# Patient Record
Sex: MALE | Race: WHITE | Employment: OTHER | URBAN - METROPOLITAN AREA
[De-identification: names, ages, dates, MRNs, and addresses within clinical notes are randomized per-mention and may not be internally consistent; named-entity substitution may affect disease eponyms.]

---

## 2017-04-25 ENCOUNTER — TELEPHONE (OUTPATIENT)
Dept: HEMATOLOGY/ONCOLOGY | Facility: HOSPITAL | Age: 67
End: 2017-04-25

## 2017-04-25 NOTE — TELEPHONE ENCOUNTER
Daughter reports pt is starting a chemo tx 4/26 with a \"IV of 55 hours\"- possibly FOLFOX, is unable to move pt here until sometime in June, wanted to know process to make a seem less move during chemo.   Daughter, Tremaine Mcgraw is POA, asked to come to [facility] to sig

## 2017-04-27 ENCOUNTER — NURSE ONLY (OUTPATIENT)
Dept: HEMATOLOGY/ONCOLOGY | Facility: HOSPITAL | Age: 67
End: 2017-04-27

## 2017-04-27 NOTE — PROGRESS NOTES
Received release of information for records.  Signed by POA and faxed to office of Dr. Honey Martinez with [facility] at [phone] per MD request.

## 2017-05-08 ENCOUNTER — TELEPHONE (OUTPATIENT)
Dept: HEMATOLOGY/ONCOLOGY | Facility: HOSPITAL | Age: 67
End: 2017-05-08

## 2017-05-08 NOTE — TELEPHONE ENCOUNTER
Dtr calling to request consult appointment. Asking if records received. Will verify with MD.  Call sent to [facility] to schedule.

## 2017-05-08 NOTE — TELEPHONE ENCOUNTER
\"  I got some records but seemed like they aren't complete or the most recent.  I'm happy to see, but not sure if we can guarantee that we can give chemo the same day.  Are they expecting chemo same day?  I need more recent records of chemo administration

## 2017-05-23 ENCOUNTER — OFFICE VISIT (OUTPATIENT)
Dept: HEMATOLOGY/ONCOLOGY | Age: 67
End: 2017-05-23
Attending: INTERNAL MEDICINE
Payer: MEDICARE

## 2017-05-23 ENCOUNTER — NURSE ONLY (OUTPATIENT)
Dept: HEMATOLOGY/ONCOLOGY | Age: 67
End: 2017-05-23
Attending: INTERNAL MEDICINE
Payer: MEDICARE

## 2017-05-23 VITALS
BODY MASS INDEX: 20 KG/M2 | HEART RATE: 90 BPM | WEIGHT: 160 LBS | SYSTOLIC BLOOD PRESSURE: 138 MMHG | OXYGEN SATURATION: 98 % | RESPIRATION RATE: 18 BRPM | DIASTOLIC BLOOD PRESSURE: 69 MMHG | TEMPERATURE: 99 F

## 2017-05-23 DIAGNOSIS — C18.9 COLON ADENOCARCINOMA (HCC): Primary | ICD-10-CM

## 2017-05-23 DIAGNOSIS — T45.1X5A CHEMOTHERAPY INDUCED NAUSEA AND VOMITING: ICD-10-CM

## 2017-05-23 DIAGNOSIS — D50.0 IRON DEFICIENCY ANEMIA DUE TO CHRONIC BLOOD LOSS: ICD-10-CM

## 2017-05-23 DIAGNOSIS — R11.2 CHEMOTHERAPY INDUCED NAUSEA AND VOMITING: ICD-10-CM

## 2017-05-23 PROCEDURE — 85025 COMPLETE CBC W/AUTO DIFF WBC: CPT

## 2017-05-23 PROCEDURE — 82728 ASSAY OF FERRITIN: CPT

## 2017-05-23 PROCEDURE — 36591 DRAW BLOOD OFF VENOUS DEVICE: CPT

## 2017-05-23 PROCEDURE — 85045 AUTOMATED RETICULOCYTE COUNT: CPT

## 2017-05-23 PROCEDURE — 99205 OFFICE O/P NEW HI 60 MIN: CPT | Performed by: INTERNAL MEDICINE

## 2017-05-23 PROCEDURE — 80053 COMPREHEN METABOLIC PANEL: CPT

## 2017-05-23 RX ORDER — SODIUM CHLORIDE 0.9 % (FLUSH) 0.9 %
10 SYRINGE (ML) INJECTION ONCE
Status: COMPLETED | OUTPATIENT
Start: 2017-05-23 | End: 2017-05-23

## 2017-05-23 RX ADMIN — SODIUM CHLORIDE 0.9 % (FLUSH) 10 ML: 0.9 % SYRINGE (ML) INJECTION at 14:01:00

## 2017-05-23 NOTE — PROGRESS NOTES
Hematology/Oncology Initial Consultation Note    Patient Name: Marissa Montelongo  Medical Record Number: [id number]    YOB: 1950   Date of Consultation: 5/23/2017   PCP: Dr. Solo Hanson [phone]    Reason for Consultation:  Marissa Montelongo with his daughter today after just moving back from [place] [address] where he was living independently prior to his hospitalization and cancer diagnosis.   He reports that he had a heart attack 5/2016 which was stented and was on dual antiplatelets for about a month (bar some back spasms   • Colon adenocarcinoma [facility])           Past Surgical History    COLON SURGERY  2/17/17    Comment LAR, laparoscopic sigmoid colectomy and resection of portion of small bowel with asher procedure and partial cystectomy with bladder rep of Children: N/A     Occupational History  None on file     Social History Main Topics   Smoking status: Former Smoker  1.00 Packs/Day  For 20.00 Years     Types: Cigarettes    Quit date: 01/01/1983    Smokeless tobacco: Not on file    Comment: cong Date   WBC 6.0 05/23/2017   HGB 10.3* 05/23/2017   HCT 33.3* 05/23/2017   MCV 86.5 05/23/2017   MCH 26.8* 05/23/2017   MCHC 30.9* 05/23/2017   RDW 18.7* 05/23/2017   .0 05/23/2017       Lab Results  Component Value Date   GLU 92 05/23/2017   BUN 11 and observe how he does next cycle.    -will arrange a formal chemo education with our APN so he and his family can learn about taking prn zofran and/or compazine as needed. *iron deficiency anemia  -based on low ret-hgb value.   Is likely due to GI ble

## 2017-05-23 NOTE — PROGRESS NOTES
Pt here for MD consultation for h/o colon ca. Recently moved to [facility] from [place] to be near family. Last chemotherapy tx was 5/10/17. Had 3-4 days of weakness, poor appetite, nausea after chemotherapy. No changes in ostomy output.      Education Record

## 2017-05-23 NOTE — PROGRESS NOTES
Called Dr. Carol Hobson, pt's general surgeon office- spoke with Heather Viveros- requested all surgical records including path, op report, H&P. Records will be faxed tomorrow, 5/24.     Called Dr. Jorge Miranda, pt's medical oncologist office- requested all records be fa

## 2017-05-25 PROBLEM — D50.0 IRON DEFICIENCY ANEMIA DUE TO CHRONIC BLOOD LOSS: Status: ACTIVE | Noted: 2017-05-25

## 2017-05-25 RX ORDER — PROCHLORPERAZINE MALEATE 10 MG
10 TABLET ORAL EVERY 6 HOURS PRN
Qty: 30 TABLET | Refills: 3 | Status: SHIPPED | OUTPATIENT
Start: 2017-05-25 | End: 2017-05-31

## 2017-05-25 RX ORDER — ONDANSETRON HYDROCHLORIDE 8 MG/1
8 TABLET, FILM COATED ORAL EVERY 8 HOURS PRN
Qty: 30 TABLET | Refills: 3 | Status: SHIPPED | OUTPATIENT
Start: 2017-05-25 | End: 2017-05-31

## 2017-05-26 RX ORDER — DOCUSATE SODIUM 100 MG/1
100 CAPSULE, LIQUID FILLED ORAL 2 TIMES DAILY PRN
COMMUNITY
End: 2017-07-25

## 2017-05-26 RX ORDER — HYDROCODONE BITARTRATE AND ACETAMINOPHEN 10; 325 MG/1; MG/1
1 TABLET ORAL EVERY 4 HOURS PRN
Status: ON HOLD | COMMUNITY
End: 2018-04-27 | Stop reason: ALTCHOICE

## 2017-05-26 RX ORDER — MULTIVITAMIN
1 TABLET ORAL DAILY
COMMUNITY
End: 2019-04-08

## 2017-05-26 RX ORDER — MULTIVIT WITH MINERALS/LUTEIN
250 TABLET ORAL DAILY
COMMUNITY

## 2017-05-26 RX ORDER — LANCETS 28 GAUGE
EACH MISCELLANEOUS
COMMUNITY
Start: 2016-02-11

## 2017-05-26 RX ORDER — DULOXETIN HYDROCHLORIDE 60 MG/1
60 CAPSULE, DELAYED RELEASE ORAL DAILY
COMMUNITY
End: 2017-10-24

## 2017-05-26 RX ORDER — METOPROLOL TARTRATE 50 MG/1
50 TABLET, FILM COATED ORAL DAILY
Status: ON HOLD | COMMUNITY
Start: 2016-02-10 | End: 2018-05-02

## 2017-05-26 RX ORDER — NITROGLYCERIN 0.4 MG/1
0.4 TABLET SUBLINGUAL EVERY 5 MIN PRN
COMMUNITY
Start: 2016-02-10

## 2017-05-26 RX ORDER — ALPRAZOLAM 0.25 MG/1
0.25 TABLET ORAL 3 TIMES DAILY PRN
COMMUNITY
End: 2018-02-20

## 2017-05-26 RX ORDER — ASPIRIN 81 MG/1
81 TABLET ORAL
COMMUNITY
Start: 2017-03-03 | End: 2017-05-26

## 2017-05-26 RX ORDER — RANITIDINE 150 MG/1
150 TABLET ORAL 2 TIMES DAILY
COMMUNITY

## 2017-05-26 RX ORDER — MELATONIN
325
COMMUNITY
End: 2017-06-20

## 2017-05-26 NOTE — PROGRESS NOTES
Discharge medication  sheet rec'd from [facility], updated ECC med review per this sheet as of 5/26/17.

## 2017-05-30 PROBLEM — R11.2 CHEMOTHERAPY INDUCED NAUSEA AND VOMITING: Status: ACTIVE | Noted: 2017-05-30

## 2017-05-30 PROBLEM — T45.1X5A CHEMOTHERAPY INDUCED NAUSEA AND VOMITING: Status: ACTIVE | Noted: 2017-05-30

## 2017-05-31 ENCOUNTER — OFFICE VISIT (OUTPATIENT)
Dept: HEMATOLOGY/ONCOLOGY | Facility: HOSPITAL | Age: 67
End: 2017-05-31
Attending: INTERNAL MEDICINE
Payer: MEDICARE

## 2017-05-31 ENCOUNTER — OFFICE VISIT (OUTPATIENT)
Dept: GENETICS | Facility: HOSPITAL | Age: 67
End: 2017-05-31
Attending: INTERNAL MEDICINE
Payer: MEDICARE

## 2017-05-31 ENCOUNTER — SNF/IP PROF CHARGE ONLY (OUTPATIENT)
Dept: HEMATOLOGY/ONCOLOGY | Facility: HOSPITAL | Age: 67
End: 2017-05-31

## 2017-05-31 VITALS
RESPIRATION RATE: 16 BRPM | HEART RATE: 55 BPM | HEIGHT: 73.39 IN | BODY MASS INDEX: 20.91 KG/M2 | TEMPERATURE: 98 F | WEIGHT: 159.5 LBS

## 2017-05-31 DIAGNOSIS — C18.9 COLON ADENOCARCINOMA (HCC): Primary | ICD-10-CM

## 2017-05-31 DIAGNOSIS — Z83.71 FAMILY HISTORY OF COLONIC POLYPS: ICD-10-CM

## 2017-05-31 DIAGNOSIS — Z71.9 ENCOUNTER FOR EDUCATION: ICD-10-CM

## 2017-05-31 DIAGNOSIS — C18.9 ADENOCARCINOMA OF COLON (HCC): Primary | ICD-10-CM

## 2017-05-31 DIAGNOSIS — D50.0 IRON DEFICIENCY ANEMIA DUE TO CHRONIC BLOOD LOSS: ICD-10-CM

## 2017-05-31 DIAGNOSIS — T45.1X5A CHEMOTHERAPY INDUCED NAUSEA AND VOMITING: ICD-10-CM

## 2017-05-31 DIAGNOSIS — R11.2 CHEMOTHERAPY INDUCED NAUSEA AND VOMITING: ICD-10-CM

## 2017-05-31 DIAGNOSIS — C18.9 COLON ADENOCARCINOMA (HCC): ICD-10-CM

## 2017-05-31 PROCEDURE — 85007 BL SMEAR W/DIFF WBC COUNT: CPT

## 2017-05-31 PROCEDURE — 85027 COMPLETE CBC AUTOMATED: CPT

## 2017-05-31 PROCEDURE — 99215 OFFICE O/P EST HI 40 MIN: CPT | Performed by: INTERNAL MEDICINE

## 2017-05-31 PROCEDURE — 96413 CHEMO IV INFUSION 1 HR: CPT

## 2017-05-31 PROCEDURE — 96368 THER/DIAG CONCURRENT INF: CPT

## 2017-05-31 PROCEDURE — G9678 ONCOLOGY CARE MODEL SERVICE: HCPCS | Performed by: INTERNAL MEDICINE

## 2017-05-31 PROCEDURE — 80053 COMPREHEN METABOLIC PANEL: CPT

## 2017-05-31 PROCEDURE — 96411 CHEMO IV PUSH ADDL DRUG: CPT

## 2017-05-31 PROCEDURE — 96415 CHEMO IV INFUSION ADDL HR: CPT

## 2017-05-31 PROCEDURE — 96416 CHEMO PROLONG INFUSE W/PUMP: CPT

## 2017-05-31 PROCEDURE — 85025 COMPLETE CBC W/AUTO DIFF WBC: CPT

## 2017-05-31 PROCEDURE — 96375 TX/PRO/DX INJ NEW DRUG ADDON: CPT

## 2017-05-31 RX ORDER — ONDANSETRON HYDROCHLORIDE 8 MG/1
8 TABLET, FILM COATED ORAL EVERY 8 HOURS PRN
Qty: 30 TABLET | Refills: 3 | Status: SHIPPED | OUTPATIENT
Start: 2017-05-31

## 2017-05-31 RX ORDER — SODIUM CHLORIDE 0.9 % (FLUSH) 0.9 %
10 SYRINGE (ML) INJECTION ONCE
Status: COMPLETED | OUTPATIENT
Start: 2017-05-31 | End: 2017-05-31

## 2017-05-31 RX ORDER — PROCHLORPERAZINE MALEATE 10 MG
10 TABLET ORAL EVERY 6 HOURS PRN
Qty: 30 TABLET | Refills: 3 | Status: SHIPPED | OUTPATIENT
Start: 2017-05-31

## 2017-05-31 RX ORDER — FLUOROURACIL 50 MG/ML
2400 INJECTION, SOLUTION INTRAVENOUS CONTINUOUS
Status: DISCONTINUED | OUTPATIENT
Start: 2017-05-31 | End: 2017-05-31

## 2017-05-31 RX ORDER — FLUOROURACIL 50 MG/ML
400 INJECTION, SOLUTION INTRAVENOUS ONCE
Status: COMPLETED | OUTPATIENT
Start: 2017-05-31 | End: 2017-05-31

## 2017-05-31 RX ADMIN — SODIUM CHLORIDE 0.9 % (FLUSH) 10 ML: 0.9 % SYRINGE (ML) INJECTION at 10:00:00

## 2017-05-31 RX ADMIN — FLUOROURACIL 800 MG: 50 INJECTION, SOLUTION INTRAVENOUS at 14:10:00

## 2017-05-31 RX ADMIN — FLUOROURACIL 4800 MG: 50 INJECTION, SOLUTION INTRAVENOUS at 14:15:00

## 2017-05-31 NOTE — PROGRESS NOTES
Education Record    Learner:  Patient    Disease / Diagnosis:Colon adenocarcinoma    Barriers / Limitations:  None   Comments:    Method:  Brief focused   Comments:    General Topics:  Infection, Medication, Pain, Precautions, Procedure, Side effects and s

## 2017-05-31 NOTE — PROGRESS NOTES
Referring Provider:  Dr. Lili Coughlin    Reason for Referral:  Erin Moon was referred for genetic counseling because of a personal history of colon cancer with abnormal tumor studies.   Mr. Amor Gomez is a 74year-old man of [facility] [place] [facility], [place], and [facility] was one of 12 siblings, three of whom may have had some type of cancer. In addition, his maternal aunt had some type of cancer in her 46s or 62s. No further information is available. There is no known Ashkenazi Taoism ancestry.    Please see the pedigr carriers, including increased colorectal cancer screening by colonoscopy every 1-2 years starting at age 23-25. Surveillance and screening for other cancers associated with Up syndrome may also be indicated.   All medical management decisions should be testing for a colon cancer panel to address potential hereditary cancer risks. The panel would include MLH1, PMS2, and polyposis genes such as MUTYH and APC. I discussed sending the genetic testing to [facility].   Consequently, if Medicare denies the netta develop cancer. If Mr. Christine Melendrez were to test positive for a mutation, his children would have a 50% chance of carrying the same mutation. They would have the option of pursuing targeted genetic testing at a reduced cost to clarify their cancer risks.  Stacy Kennedy

## 2017-05-31 NOTE — PATIENT INSTRUCTIONS
For Dr. Phyllis Mcdaniel nurse line, call [phone] with any questions or concerns Monday through Friday 8:00 to 4:30. After hours or weekends for emergent needs [phone].

## 2017-05-31 NOTE — PROGRESS NOTES
Chemotherapy Education    Patient:Cornelius Trujillo  Date: 5/31/17  Barriers / Limitations:  Cognitive limitations  Diagnosis: colon cancer  Caregivers present: daughter    Drug names: 5FU, leucovorin, oxaliplatin    Chemotherapy education goals:  · Learn the jenn dryness  Comments.     Vesicants / Irritants:  Potential extravasation at site of administration:  Achieved  Signs / symptoms include redness, swelling, pain, burning, or blistering at the site of administration:  Achieved  Notify MD/RN IMMEDIATELY if you h

## 2017-05-31 NOTE — PROGRESS NOTES
Patient here for MD f/u and treatment. C3 chemo. Griselda Harmon Received 2 cycles previously in [place]. Moved to be closer to family. States feeling well today. Slight fatigue, minimal SOB with exertion.   Taking Hydrocodone about every 4 hours when experiencing BLE pain

## 2017-05-31 NOTE — PROGRESS NOTES
Hematology/Oncology Clinic Follow Up Visit    Patient Name: Alvin Louis  Medical Record Number: [id number]    YOB: 1950    PCP: Dr. Andrez Hall ph [phone]    Reason for Consultation:  Alvin Louis was seen today for the diagnosis of joint pains. He continues to participate in PT at his assisted living facility. He denies any new issues. Energy is improving. Denies any changes in his ostomy output. No bleeding. No neuropathy.      Past Medical History:  Past Medical History   Diagn Oral Tab Take 1 tablet by mouth every 4 (four) hours as needed for Pain. Disp:  Rfl:    Ondansetron HCl (ZOFRAN) 8 MG tablet Take 1 tablet (8 mg total) by mouth every 8 (eight) hours as needed for Nausea.  Disp: 30 tablet Rfl: 3   ASPIRIN 81 MG OR TABS 1 TA Examination:  General: Patient is alert and oriented, not in acute distress  Psych: Mood and affect are appropriate  Eyes: EOMI  ENT: Oropharynx is clear  CV: Regular rate and rhythm, no murmurs, no LE edema  Respiratory: Lungs clear to auscultation bilate with nausea, vomiting, poor appetite and fatigue previously due to this  -will proceed with cycle 3 FOLFOX today as below:   -5FU 400mg/m2 IV d1 then 2400mg/m2 CIVI over 46hrs   -Leucovorin 400mg/m2 IV d1   -Oxaliplatin 85mg/m2 IV d1   cycle length [id number]

## 2017-06-01 ENCOUNTER — TELEPHONE (OUTPATIENT)
Dept: HEMATOLOGY/ONCOLOGY | Facility: HOSPITAL | Age: 67
End: 2017-06-01

## 2017-06-01 NOTE — TELEPHONE ENCOUNTER
Has to arrange rides for patient, asking to set up several cycles of chemo ahead of time. OK per Dr. Mello Mcmahon. Transferred to scheduling to make appts. OK given to [facility].

## 2017-06-02 ENCOUNTER — NURSE ONLY (OUTPATIENT)
Dept: HEMATOLOGY/ONCOLOGY | Age: 67
End: 2017-06-02
Attending: INTERNAL MEDICINE
Payer: MEDICARE

## 2017-06-02 DIAGNOSIS — C18.9 COLON ADENOCARCINOMA (HCC): Primary | ICD-10-CM

## 2017-06-02 PROCEDURE — 96523 IRRIG DRUG DELIVERY DEVICE: CPT

## 2017-06-02 RX ORDER — SODIUM CHLORIDE 0.9 % (FLUSH) 0.9 %
10 SYRINGE (ML) INJECTION ONCE
Status: COMPLETED | OUTPATIENT
Start: 2017-06-02 | End: 2017-06-02

## 2017-06-02 RX ADMIN — SODIUM CHLORIDE 0.9 % (FLUSH) 10 ML: 0.9 % SYRINGE (ML) INJECTION at 11:38:00

## 2017-06-02 NOTE — PROGRESS NOTES
Daughter accompanied patient for pump d/c today. He lives next door to the [facility] [place] and the daughter will be disconnecting his pump. Written instructions provided and talked daughter through technique.   Appeared comfortable

## 2017-06-06 ENCOUNTER — OFFICE VISIT (OUTPATIENT)
Dept: HEMATOLOGY/ONCOLOGY | Age: 67
End: 2017-06-06
Attending: INTERNAL MEDICINE
Payer: MEDICARE

## 2017-06-06 ENCOUNTER — NURSE ONLY (OUTPATIENT)
Dept: HEMATOLOGY/ONCOLOGY | Age: 67
End: 2017-06-06
Attending: INTERNAL MEDICINE
Payer: MEDICARE

## 2017-06-06 ENCOUNTER — DIETICIAN VISIT (OUTPATIENT)
Dept: HEMATOLOGY/ONCOLOGY | Facility: HOSPITAL | Age: 67
End: 2017-06-06

## 2017-06-06 VITALS
OXYGEN SATURATION: 100 % | TEMPERATURE: 97 F | HEART RATE: 54 BPM | DIASTOLIC BLOOD PRESSURE: 75 MMHG | BODY MASS INDEX: 20 KG/M2 | SYSTOLIC BLOOD PRESSURE: 147 MMHG | WEIGHT: 153.38 LBS | RESPIRATION RATE: 18 BRPM

## 2017-06-06 DIAGNOSIS — R19.7 DIARRHEA: ICD-10-CM

## 2017-06-06 DIAGNOSIS — R11.0 NAUSEA: ICD-10-CM

## 2017-06-06 DIAGNOSIS — R19.7 DIARRHEA, UNSPECIFIED TYPE: ICD-10-CM

## 2017-06-06 DIAGNOSIS — C18.9 COLON ADENOCARCINOMA (HCC): Primary | ICD-10-CM

## 2017-06-06 DIAGNOSIS — R11.2 NON-INTRACTABLE VOMITING WITH NAUSEA, UNSPECIFIED VOMITING TYPE: ICD-10-CM

## 2017-06-06 PROCEDURE — 96360 HYDRATION IV INFUSION INIT: CPT

## 2017-06-06 PROCEDURE — 83735 ASSAY OF MAGNESIUM: CPT

## 2017-06-06 PROCEDURE — 85025 COMPLETE CBC W/AUTO DIFF WBC: CPT

## 2017-06-06 PROCEDURE — 96374 THER/PROPH/DIAG INJ IV PUSH: CPT

## 2017-06-06 PROCEDURE — 96361 HYDRATE IV INFUSION ADD-ON: CPT

## 2017-06-06 PROCEDURE — 99214 OFFICE O/P EST MOD 30 MIN: CPT | Performed by: NURSE PRACTITIONER

## 2017-06-06 PROCEDURE — 80053 COMPREHEN METABOLIC PANEL: CPT

## 2017-06-06 RX ORDER — SODIUM CHLORIDE 0.9 % (FLUSH) 0.9 %
10 SYRINGE (ML) INJECTION ONCE
Status: COMPLETED | OUTPATIENT
Start: 2017-06-06 | End: 2017-06-06

## 2017-06-06 RX ORDER — SODIUM CHLORIDE 9 MG/ML
1000 INJECTION, SOLUTION INTRAVENOUS ONCE
Status: COMPLETED | OUTPATIENT
Start: 2017-06-06 | End: 2017-06-06

## 2017-06-06 RX ORDER — LOPERAMIDE HYDROCHLORIDE 2 MG/1
2 TABLET ORAL AS NEEDED
Qty: 30 TABLET | Refills: 0 | Status: SHIPPED | OUTPATIENT
Start: 2017-06-06

## 2017-06-06 RX ADMIN — SODIUM CHLORIDE 1000 ML: 9 INJECTION, SOLUTION INTRAVENOUS at 12:05:00

## 2017-06-06 RX ADMIN — SODIUM CHLORIDE 0.9 % (FLUSH) 10 ML: 0.9 % SYRINGE (ML) INJECTION at 13:06:00

## 2017-06-06 NOTE — PROGRESS NOTES
Nutrition screen complete as triggered by [facility] dx of colon cancer. Chart reviewed. Oncologist contacted. Pt appears at a moderate nutrition risk at present. RD available on consult.

## 2017-06-06 NOTE — PROGRESS NOTES
ANP Visit Note    Patient Name: Michael Rausch   YOB: 1950   Medical Record Number: [id number]   CSN: [id number]   Date of visit: 6/6/2017       Chief Complaint/Reason for Visit: Follow up chemotherapy colon adenocarcinoma      History of Present showed loss of expression of PMS2 and weak MLH-1 immunoreactivity (10%). Normal MSH6 and MSH2. pT4bN0  -4/13/17- CEA 3.1  -4/19/17- PET/CT- no distant disease.  +post-operative changes s/p LAR with LLQ ostomy placement.  Intense focal uptake in left pelvis 01/01/1983    Smokeless tobacco: Not on file    Comment: ocassional cigar - quit 25 years ago    Alcohol Use: Yes  0.0 oz/week    0 Standard drinks or equivalent per week         Comment: occasionally- rare.   No history of excessive use    Drug Use: No Sleep., Disp: , Rfl:   •  DULoxetine HCl 60 MG Oral Cap DR Particles, Take 60 mg by mouth daily. , Disp: , Rfl:   •  ascorbic acid, VITAMIN C, 250 MG Oral Tab, Take 250 mg by mouth daily. , Disp: , Rfl:   •  HYDROcodone-acetaminophen  MG Oral Tab, Take Status: Preliminary   AST Date: 06/06/2017   Value: 18  Ref Range 15-41 U/L Status: Preliminary   Alt Date: 06/06/2017   Value: 18  Ref Range 17-63 U/L Status: Preliminary   Albumin Date: 06/06/2017   Value: 3.5  Ref Range 3.5-4.8 g/dL Status: Preliminary x10(3) uL Status: Final   Immature Granulocyte Absolute Date: 06/06/2017   Value: 0.01  Ref Range 0.00-1.00 x10(3) uL Status: Final   Neutrophil % Date: 06/06/2017   Value: 70.2  Status: Final   Lymphocyte % Date: 06/06/2017   Value: 23.7  Status: Final

## 2017-06-06 NOTE — PROGRESS NOTES
Pt here for APN f/u. Pt rec'd cycle 3 FOLFOX 5/31, notes this time \"hit him harder than before\". Pt notes feeling nausea, takes Zofran, instruced on alternating schedule with Compazine. Pt notes feeling weak and low appetite.  Pt's daughter feels pt is mo

## 2017-06-06 NOTE — PROGRESS NOTES
Per MORE Li. Labs good. Finished 500 cc NS only and OK to discharge. Discussed with patient. RX for Imodium given so care facility can administer.   Education Record    Learner:  Patient    Disease / Diagnosis: colon adeno    Barriers / Limitations:  No

## 2017-06-06 NOTE — PATIENT INSTRUCTIONS
Take Imodium as prescribed. STOP stool softener- see nursing order.   Call triage RN with questions [phone]

## 2017-06-13 ENCOUNTER — APPOINTMENT (OUTPATIENT)
Dept: HEMATOLOGY/ONCOLOGY | Age: 67
End: 2017-06-13
Attending: INTERNAL MEDICINE
Payer: MEDICARE

## 2017-06-13 ENCOUNTER — TELEPHONE (OUTPATIENT)
Dept: HEMATOLOGY/ONCOLOGY | Facility: HOSPITAL | Age: 67
End: 2017-06-13

## 2017-06-13 NOTE — TELEPHONE ENCOUNTER
Pt LM with answering service last night to cancel appointment. Called number listed- this is his dtr's number. She was unaware that he cancelled appointment today. Spoke with patient- he c/o severe bilateral knee pains, h/o arthritis, and gout.  He is eat

## 2017-06-14 ENCOUNTER — TELEPHONE (OUTPATIENT)
Dept: HEMATOLOGY/ONCOLOGY | Facility: HOSPITAL | Age: 67
End: 2017-06-14

## 2017-06-15 ENCOUNTER — APPOINTMENT (OUTPATIENT)
Dept: HEMATOLOGY/ONCOLOGY | Age: 67
End: 2017-06-15
Attending: INTERNAL MEDICINE
Payer: MEDICARE

## 2017-06-20 ENCOUNTER — OFFICE VISIT (OUTPATIENT)
Dept: HEMATOLOGY/ONCOLOGY | Age: 67
End: 2017-06-20
Attending: INTERNAL MEDICINE
Payer: MEDICARE

## 2017-06-20 VITALS
TEMPERATURE: 99 F | OXYGEN SATURATION: 99 % | BODY MASS INDEX: 21 KG/M2 | DIASTOLIC BLOOD PRESSURE: 84 MMHG | RESPIRATION RATE: 18 BRPM | HEART RATE: 67 BPM | WEIGHT: 158.19 LBS | SYSTOLIC BLOOD PRESSURE: 174 MMHG

## 2017-06-20 DIAGNOSIS — R53.83 FATIGUE DUE TO TREATMENT: ICD-10-CM

## 2017-06-20 DIAGNOSIS — T45.1X5A CHEMOTHERAPY INDUCED NAUSEA AND VOMITING: ICD-10-CM

## 2017-06-20 DIAGNOSIS — D50.0 IRON DEFICIENCY ANEMIA DUE TO CHRONIC BLOOD LOSS: ICD-10-CM

## 2017-06-20 DIAGNOSIS — C18.9 COLON ADENOCARCINOMA (HCC): Primary | ICD-10-CM

## 2017-06-20 DIAGNOSIS — T45.1X5A CHEMOTHERAPY INDUCED DIARRHEA: ICD-10-CM

## 2017-06-20 DIAGNOSIS — R11.2 CHEMOTHERAPY INDUCED NAUSEA AND VOMITING: ICD-10-CM

## 2017-06-20 DIAGNOSIS — K52.1 CHEMOTHERAPY INDUCED DIARRHEA: ICD-10-CM

## 2017-06-20 PROCEDURE — 96415 CHEMO IV INFUSION ADDL HR: CPT

## 2017-06-20 PROCEDURE — 80053 COMPREHEN METABOLIC PANEL: CPT

## 2017-06-20 PROCEDURE — 96367 TX/PROPH/DG ADDL SEQ IV INF: CPT

## 2017-06-20 PROCEDURE — 96413 CHEMO IV INFUSION 1 HR: CPT

## 2017-06-20 PROCEDURE — 99215 OFFICE O/P EST HI 40 MIN: CPT | Performed by: INTERNAL MEDICINE

## 2017-06-20 PROCEDURE — 96375 TX/PRO/DX INJ NEW DRUG ADDON: CPT

## 2017-06-20 PROCEDURE — 85025 COMPLETE CBC W/AUTO DIFF WBC: CPT

## 2017-06-20 PROCEDURE — 96368 THER/DIAG CONCURRENT INF: CPT

## 2017-06-20 PROCEDURE — 96416 CHEMO PROLONG INFUSE W/PUMP: CPT

## 2017-06-20 RX ORDER — OLANZAPINE 2.5 MG/1
2.5 TABLET ORAL NIGHTLY
Qty: 30 TABLET | Refills: 3 | Status: SHIPPED | OUTPATIENT
Start: 2017-06-20 | End: 2017-08-29

## 2017-06-20 RX ORDER — FLUOROURACIL 50 MG/ML
2400 INJECTION, SOLUTION INTRAVENOUS CONTINUOUS
Status: DISCONTINUED | OUTPATIENT
Start: 2017-06-20 | End: 2017-06-20

## 2017-06-20 RX ORDER — CELECOXIB 100 MG/1
100 CAPSULE ORAL DAILY
COMMUNITY
End: 2018-01-20

## 2017-06-20 RX ADMIN — FLUOROURACIL 4700 MG: 50 INJECTION, SOLUTION INTRAVENOUS at 15:00:00

## 2017-06-20 NOTE — PROGRESS NOTES
Hematology/Oncology Clinic Follow Up Visit    Patient Name: Cholo Soria  Medical Record Number: [id number]    YOB: 1950    PCP: Dr. Sue Galeano ph [phone]    Reason for Consultation:  Cholo Soria was seen today for the diagnosis of 5FU bolus omitted    =============================================  Interval events:  Presents for follow up and cycle 4 FOLFOX today. He reports that he had persistent nausea and vomiting with severe fatigue for a full 2 weeks.   He was asked to come in f loose stool and then 1 tablet after every subsequent loose stool up to 16 mg (8 tablets) daily. Disp: 30 tablet Rfl: 0   Ondansetron HCl (ZOFRAN) 8 MG tablet Take 1 tablet (8 mg total) by mouth every 8 (eight) hours as needed for Nausea.  Disp: 30 tablet Rf date: 01/01/1983    Smokeless tobacco: Not on file    Comment: ocassional cigar - quit 25 years ago    Alcohol Use: Yes  0.0 oz/week    0 Standard drinks or equivalent per week         Comment: occasionally- rare.   No history of excessive use    Drug Use: 05/31/2017   HCT 35.9* 06/20/2017   MCV 86.5 06/20/2017   MCH 27.2 06/20/2017   MCHC 31.5 06/20/2017   RDW 19.9* 06/20/2017   .0 06/20/2017   .0 06/06/2017   .0* 05/31/2017       Lab Results  Component Value Date   GLU 96 06/20/2017 night  -advised to call if not working as would assess and modify    *diarrhea- related to chemotherapy  -cont imodium prn    *fatigue due to treatment  -dose reduce as above  -will have pt return in 1 week to assess after chemo today.   May due to dehydrat

## 2017-06-20 NOTE — PROGRESS NOTES
Patient is here today for follow up with Dr. Loretta Torres for colon cancer. Patient denies pain today. Stated very fatigued post last treatment - was in recliner or bed for 2 1/2 weeks. Wakes frequently at night to urinate.  Emesis one time daily for 2 weeks post

## 2017-06-20 NOTE — PATIENT INSTRUCTIONS
To reach Dr Penelope Thomas triage nurse during business hours, please call [phone]. After hours, including weekends, evenings, and holidays, please call the main number [phone] for emergent needs.

## 2017-06-22 ENCOUNTER — NURSE ONLY (OUTPATIENT)
Dept: HEMATOLOGY/ONCOLOGY | Age: 67
End: 2017-06-22
Attending: INTERNAL MEDICINE
Payer: MEDICARE

## 2017-06-22 DIAGNOSIS — C18.9 COLON ADENOCARCINOMA (HCC): Primary | ICD-10-CM

## 2017-06-22 PROCEDURE — 96523 IRRIG DRUG DELIVERY DEVICE: CPT

## 2017-06-22 RX ORDER — SODIUM CHLORIDE 0.9 % (FLUSH) 0.9 %
10 SYRINGE (ML) INJECTION ONCE
Status: COMPLETED | OUTPATIENT
Start: 2017-06-22 | End: 2017-06-22

## 2017-06-22 RX ADMIN — SODIUM CHLORIDE 0.9 % (FLUSH) 10 ML: 0.9 % SYRINGE (ML) INJECTION at 13:06:00

## 2017-06-22 NOTE — PROGRESS NOTES
Education Record    Learner:  Patient    Disease / Diagnosis:cancer    Barriers / Limitations:  None    Method:  Brief focused, printed material and  reinforcement    General Topics:  Plan of care reviewed. Jose Guajardo from [facility] instructed on pump d/c.

## 2017-06-23 PROBLEM — R11.2 CHEMOTHERAPY INDUCED NAUSEA AND VOMITING: Status: ACTIVE | Noted: 2017-06-23

## 2017-06-23 PROBLEM — T45.1X5A CHEMOTHERAPY INDUCED DIARRHEA: Status: ACTIVE | Noted: 2017-06-23

## 2017-06-23 PROBLEM — K52.1 CHEMOTHERAPY INDUCED DIARRHEA: Status: ACTIVE | Noted: 2017-06-23

## 2017-06-23 PROBLEM — R53.83 FATIGUE DUE TO TREATMENT: Status: ACTIVE | Noted: 2017-06-23

## 2017-06-23 PROBLEM — T45.1X5A CHEMOTHERAPY INDUCED NAUSEA AND VOMITING: Status: ACTIVE | Noted: 2017-06-23

## 2017-06-27 ENCOUNTER — OFFICE VISIT (OUTPATIENT)
Dept: HEMATOLOGY/ONCOLOGY | Age: 67
End: 2017-06-27
Attending: INTERNAL MEDICINE
Payer: MEDICARE

## 2017-06-27 VITALS
OXYGEN SATURATION: 100 % | HEIGHT: 73.39 IN | HEART RATE: 54 BPM | DIASTOLIC BLOOD PRESSURE: 85 MMHG | RESPIRATION RATE: 18 BRPM | TEMPERATURE: 97 F | SYSTOLIC BLOOD PRESSURE: 169 MMHG | BODY MASS INDEX: 20.48 KG/M2 | WEIGHT: 156.19 LBS

## 2017-06-27 DIAGNOSIS — C18.9 COLON ADENOCARCINOMA (HCC): Primary | ICD-10-CM

## 2017-06-27 DIAGNOSIS — R11.2 CHEMOTHERAPY INDUCED NAUSEA AND VOMITING: ICD-10-CM

## 2017-06-27 DIAGNOSIS — K52.1 CHEMOTHERAPY INDUCED DIARRHEA: ICD-10-CM

## 2017-06-27 DIAGNOSIS — D50.0 IRON DEFICIENCY ANEMIA DUE TO CHRONIC BLOOD LOSS: ICD-10-CM

## 2017-06-27 DIAGNOSIS — T45.1X5A CHEMOTHERAPY INDUCED NAUSEA AND VOMITING: ICD-10-CM

## 2017-06-27 DIAGNOSIS — R53.83 FATIGUE DUE TO TREATMENT: ICD-10-CM

## 2017-06-27 DIAGNOSIS — T45.1X5A CHEMOTHERAPY INDUCED DIARRHEA: ICD-10-CM

## 2017-06-27 PROCEDURE — 85025 COMPLETE CBC W/AUTO DIFF WBC: CPT

## 2017-06-27 PROCEDURE — 36593 DECLOT VASCULAR DEVICE: CPT

## 2017-06-27 PROCEDURE — 36591 DRAW BLOOD OFF VENOUS DEVICE: CPT

## 2017-06-27 PROCEDURE — 99214 OFFICE O/P EST MOD 30 MIN: CPT | Performed by: INTERNAL MEDICINE

## 2017-06-27 PROCEDURE — 80053 COMPREHEN METABOLIC PANEL: CPT

## 2017-06-27 RX ORDER — SODIUM CHLORIDE 0.9 % (FLUSH) 0.9 %
10 SYRINGE (ML) INJECTION ONCE
Status: CANCELLED | OUTPATIENT
Start: 2017-06-27

## 2017-06-27 RX ORDER — SODIUM CHLORIDE 0.9 % (FLUSH) 0.9 %
10 SYRINGE (ML) INJECTION ONCE
Status: DISCONTINUED | OUTPATIENT
Start: 2017-06-27 | End: 2017-06-27

## 2017-06-27 RX ORDER — SODIUM CHLORIDE 9 MG/ML
1000 INJECTION, SOLUTION INTRAVENOUS ONCE
Status: CANCELLED
Start: 2017-06-27 | End: 2017-06-27

## 2017-06-27 NOTE — PROGRESS NOTES
Hematology/Oncology Clinic Follow Up Visit    Patient Name: Melodie Loaiza  Medical Record Number: [id number]    YOB: 1950    PCP: Dr. Johnson Cope  [phone]    Reason for Consultation:  Melodie Loaiza was seen today for the diagnosis of 5FU bolus omitted    =============================================  Interval events:  Presents for follow up, now 1 week following chemotherapy. This cycle we added olanzapine 2.5mg QHS which has helped his sleep, nausea and vomiting greatly.   Taking zofr (COMPAZINE) 10 mg tablet Take 1 tablet (10 mg total) by mouth every 6 (six) hours as needed for Nausea. Disp: 30 tablet Rfl: 3   Metoprolol Tartrate 50 MG Oral Tab Take 50 mg by mouth.  Disp:  Rfl:    nitroGLYCERIN 0.4 MG Sublingual SL Tab Place 0.4 mg unde a son and daughter who live locally. Has 6 grandchildren[facility]       Family Medical History:  Family History   Problem Relation Age of Onset   • Polyps Father    • Cancer Maternal Aunt 50     type unknown      Review of Sy CREATSERUM 0.76 06/06/2017   GFR 89 06/27/2017   CA 8.9 06/27/2017   ALKPHO 99 06/27/2017   AST 19 06/27/2017   ALT 18 06/27/2017   BILT 0.2 06/27/2017   TP 7.1 06/27/2017   ALB 3.3 (L) 06/27/2017   AGRATIO 1.9 07/30/2010    06/27/2017   K 4.2 06/2 ret-hgb value. Due to GI bleeding prior to cancer discovery. Was refractory to PO supplementation.   Received INFeD 1000mg IV 5/31/17  -repeat retic and ferritin in 2 weeks  -anemia is mild and stable today    *MSI?  -loss of expression of PMS2 and weak ex

## 2017-06-27 NOTE — PROGRESS NOTES
Pt here for one week MD f/u visit and possible IVF. Pt reports eating and drinking well. minimal nausea after chemotherapy but this is better. C/o diarrhea about 3 episodes a day, taking imodium throughout the day with symptom relief. +arthritis pain.  Labs

## 2017-06-29 ENCOUNTER — APPOINTMENT (OUTPATIENT)
Dept: HEMATOLOGY/ONCOLOGY | Age: 67
End: 2017-06-29
Attending: INTERNAL MEDICINE
Payer: MEDICARE

## 2017-06-29 PROBLEM — T45.1X5A CHEMOTHERAPY INDUCED DIARRHEA: Status: ACTIVE | Noted: 2017-06-29

## 2017-06-29 PROBLEM — K52.1 CHEMOTHERAPY INDUCED DIARRHEA: Status: RESOLVED | Noted: 2017-06-23 | Resolved: 2017-06-29

## 2017-06-29 PROBLEM — K52.1 CHEMOTHERAPY INDUCED DIARRHEA: Status: ACTIVE | Noted: 2017-06-29

## 2017-06-29 PROBLEM — T45.1X5A CHEMOTHERAPY INDUCED DIARRHEA: Status: RESOLVED | Noted: 2017-06-23 | Resolved: 2017-06-29

## 2017-06-30 ENCOUNTER — SNF/IP PROF CHARGE ONLY (OUTPATIENT)
Dept: HEMATOLOGY/ONCOLOGY | Facility: HOSPITAL | Age: 67
End: 2017-06-30

## 2017-06-30 ENCOUNTER — GENETICS ENCOUNTER (OUTPATIENT)
Dept: HEMATOLOGY/ONCOLOGY | Facility: HOSPITAL | Age: 67
End: 2017-06-30

## 2017-06-30 DIAGNOSIS — C18.9 COLON ADENOCARCINOMA (HCC): ICD-10-CM

## 2017-06-30 PROCEDURE — G9678 ONCOLOGY CARE MODEL SERVICE: HCPCS | Performed by: INTERNAL MEDICINE

## 2017-06-30 NOTE — PROGRESS NOTES
Referring Provider:  Dr. Davila Nine    Reason for Referral:  Robbie Cortes had genetic testing through [facility] performed on 05/31/2017 because of a personal history of colon cancer and a family history of colon polyps.     Genetic Testing Result:  A single pa Josephine’s children have a >50% likelihood of having at least one MUTYH mutation. Mac Akers indicated that she and her brother would be interested in scheduling appointments for genetic counseling and coordination of genetic testing.   She will review these res

## 2017-07-11 ENCOUNTER — APPOINTMENT (OUTPATIENT)
Dept: HEMATOLOGY/ONCOLOGY | Age: 67
End: 2017-07-11
Attending: INTERNAL MEDICINE
Payer: MEDICARE

## 2017-07-11 ENCOUNTER — OFFICE VISIT (OUTPATIENT)
Dept: HEMATOLOGY/ONCOLOGY | Age: 67
End: 2017-07-11
Attending: INTERNAL MEDICINE
Payer: MEDICARE

## 2017-07-11 VITALS
DIASTOLIC BLOOD PRESSURE: 75 MMHG | SYSTOLIC BLOOD PRESSURE: 138 MMHG | OXYGEN SATURATION: 98 % | HEART RATE: 62 BPM | TEMPERATURE: 99 F | BODY MASS INDEX: 22.07 KG/M2 | HEIGHT: 73.62 IN | WEIGHT: 170.13 LBS | RESPIRATION RATE: 18 BRPM

## 2017-07-11 DIAGNOSIS — D50.0 IRON DEFICIENCY ANEMIA DUE TO CHRONIC BLOOD LOSS: ICD-10-CM

## 2017-07-11 DIAGNOSIS — K52.1 CHEMOTHERAPY INDUCED DIARRHEA: ICD-10-CM

## 2017-07-11 DIAGNOSIS — R11.2 CHEMOTHERAPY INDUCED NAUSEA AND VOMITING: ICD-10-CM

## 2017-07-11 DIAGNOSIS — Z15.89 MONOALLELIC MUTATION OF MUTYH GENE: ICD-10-CM

## 2017-07-11 DIAGNOSIS — T45.1X5A CHEMOTHERAPY INDUCED DIARRHEA: ICD-10-CM

## 2017-07-11 DIAGNOSIS — R53.83 FATIGUE DUE TO TREATMENT: ICD-10-CM

## 2017-07-11 DIAGNOSIS — C18.9 COLON ADENOCARCINOMA (HCC): Primary | ICD-10-CM

## 2017-07-11 DIAGNOSIS — T45.1X5A CHEMOTHERAPY INDUCED NAUSEA AND VOMITING: ICD-10-CM

## 2017-07-11 LAB
ALBUMIN SERPL-MCNC: 3.4 G/DL (ref 3.5–4.8)
ALP LIVER SERPL-CCNC: 94 U/L (ref 45–117)
ALT SERPL-CCNC: 16 U/L (ref 17–63)
AST SERPL-CCNC: 18 U/L (ref 15–41)
BASOPHILS # BLD AUTO: 0.03 X10(3) UL (ref 0–0.1)
BASOPHILS NFR BLD AUTO: 0.5 %
BILIRUB SERPL-MCNC: 0.4 MG/DL (ref 0.1–2)
BUN BLD-MCNC: 15 MG/DL (ref 8–20)
CALCIUM BLD-MCNC: 8.9 MG/DL (ref 8.3–10.3)
CHLORIDE: 105 MMOL/L (ref 101–111)
CO2: 28 MMOL/L (ref 22–32)
CREAT BLD-MCNC: 0.8 MG/DL (ref 0.7–1.3)
DEPRECATED HBV CORE AB SER IA-ACNC: 103.6 NG/ML (ref 22–322)
EOSINOPHIL # BLD AUTO: 0.3 X10(3) UL (ref 0–0.3)
EOSINOPHIL NFR BLD AUTO: 5.4 %
ERYTHROCYTE [DISTWIDTH] IN BLOOD BY AUTOMATED COUNT: 21.4 % (ref 11.5–16)
GLUCOSE BLD-MCNC: 71 MG/DL (ref 70–99)
HCT VFR BLD AUTO: 34.3 % (ref 37–53)
HGB BLD-MCNC: 10.6 G/DL (ref 13–17)
IMMATURE GRANULOCYTE COUNT: 0.01 X10(3) UL (ref 0–1)
IMMATURE GRANULOCYTE RATIO %: 0.2 %
LYMPHOCYTES # BLD AUTO: 1.41 X10(3) UL (ref 0.9–4)
LYMPHOCYTES NFR BLD AUTO: 25.2 %
M PROTEIN MFR SERPL ELPH: 7.2 G/DL (ref 6.1–8.3)
MCH RBC QN AUTO: 28.1 PG (ref 27–33.2)
MCHC RBC AUTO-ENTMCNC: 30.9 G/DL (ref 31–37)
MCV RBC AUTO: 91 FL (ref 80–99)
MONOCYTES # BLD AUTO: 0.73 X10(3) UL (ref 0.1–0.6)
MONOCYTES NFR BLD AUTO: 13 %
NEUTROPHIL ABS PRELIM: 3.12 X10 (3) UL (ref 1.3–6.7)
NEUTROPHILS # BLD AUTO: 3.12 X10(3) UL (ref 1.3–6.7)
NEUTROPHILS NFR BLD AUTO: 55.7 %
PLATELET # BLD AUTO: 192 10(3)UL (ref 150–450)
PLATELET MORPHOLOGY: NORMAL
POTASSIUM SERPL-SCNC: 4.2 MMOL/L (ref 3.6–5.1)
RBC # BLD AUTO: 3.77 X10(6)UL (ref 3.8–5.8)
RED CELL DISTRIBUTION WIDTH-SD: 72.6 FL (ref 35.1–46.3)
RETIC ABS CALC AUTO: 95.8 X10(3) UL (ref 22.5–147.5)
RETIC IRF CALC: 0.19 RATIO (ref 0.09–0.3)
RETIC%: 2.5 % (ref 0.5–2.5)
RETICULOCYTE HEMOGLOBIN EQUIVALENT: 31.8 PG (ref 28.2–36.3)
SODIUM SERPL-SCNC: 138 MMOL/L (ref 136–144)
WBC # BLD AUTO: 5.6 X10(3) UL (ref 4–13)

## 2017-07-11 PROCEDURE — 85025 COMPLETE CBC W/AUTO DIFF WBC: CPT

## 2017-07-11 PROCEDURE — 85045 AUTOMATED RETICULOCYTE COUNT: CPT

## 2017-07-11 PROCEDURE — 80053 COMPREHEN METABOLIC PANEL: CPT

## 2017-07-11 PROCEDURE — 96413 CHEMO IV INFUSION 1 HR: CPT

## 2017-07-11 PROCEDURE — 82728 ASSAY OF FERRITIN: CPT

## 2017-07-11 PROCEDURE — 96375 TX/PRO/DX INJ NEW DRUG ADDON: CPT

## 2017-07-11 PROCEDURE — 96416 CHEMO PROLONG INFUSE W/PUMP: CPT

## 2017-07-11 PROCEDURE — 96415 CHEMO IV INFUSION ADDL HR: CPT

## 2017-07-11 PROCEDURE — 96367 TX/PROPH/DG ADDL SEQ IV INF: CPT

## 2017-07-11 PROCEDURE — 99215 OFFICE O/P EST HI 40 MIN: CPT | Performed by: INTERNAL MEDICINE

## 2017-07-11 PROCEDURE — 96368 THER/DIAG CONCURRENT INF: CPT

## 2017-07-11 RX ORDER — ONDANSETRON 2 MG/ML
8 INJECTION INTRAMUSCULAR; INTRAVENOUS EVERY 6 HOURS PRN
Status: CANCELLED | OUTPATIENT
Start: 2017-07-11

## 2017-07-11 RX ORDER — PROCHLORPERAZINE MALEATE 10 MG
10 TABLET ORAL EVERY 6 HOURS PRN
Status: CANCELLED | OUTPATIENT
Start: 2017-07-11

## 2017-07-11 RX ORDER — FLUOROURACIL 50 MG/ML
2400 INJECTION, SOLUTION INTRAVENOUS CONTINUOUS
Status: DISCONTINUED | OUTPATIENT
Start: 2017-07-11 | End: 2017-07-11

## 2017-07-11 RX ORDER — LORAZEPAM 2 MG/ML
INJECTION INTRAMUSCULAR EVERY 4 HOURS PRN
Status: CANCELLED | OUTPATIENT
Start: 2017-07-11

## 2017-07-11 RX ORDER — METOCLOPRAMIDE HYDROCHLORIDE 5 MG/ML
10 INJECTION INTRAMUSCULAR; INTRAVENOUS EVERY 6 HOURS PRN
Status: CANCELLED | OUTPATIENT
Start: 2017-07-11

## 2017-07-11 RX ORDER — FLUOROURACIL 50 MG/ML
2400 INJECTION, SOLUTION INTRAVENOUS CONTINUOUS
Status: CANCELLED | OUTPATIENT
Start: 2017-07-11

## 2017-07-11 RX ORDER — LORAZEPAM 0.5 MG/1
TABLET ORAL EVERY 4 HOURS PRN
Status: CANCELLED | OUTPATIENT
Start: 2017-07-11

## 2017-07-11 RX ADMIN — FLUOROURACIL 4700 MG: 50 INJECTION, SOLUTION INTRAVENOUS at 14:32:00

## 2017-07-11 NOTE — PROGRESS NOTES
Hematology/Oncology Clinic Follow Up Visit    Patient Name: Zoë Palumbo  Medical Record Number: [id number]    YOB: 1950    PCP: Dr. Janice Valle ph [phone]    Reason for Consultation:  Zoë Palumbo was seen today for the diagnosis of started to omit 5FU boluses   -7/11/17- cycle 5 FOLFOX (delayed 1 week d/t holiday and pt preference)    =============================================  Interval events:  Presents for follow up for cycle 5 FOLFOX. He is feeling well. No recent nausea.   A (10 mg total) by mouth every 6 (six) hours as needed for Nausea. Disp: 30 tablet Rfl: 3   Metoprolol Tartrate 50 MG Oral Tab Take 50 mg by mouth. Disp:  Rfl:    nitroGLYCERIN 0.4 MG Sublingual SL Tab Place 0.4 mg under the tongue.  Disp:  Rfl:    FREESTYLE Has 6 grandchildren. [facility]       Family Medical History:  Family History   Problem Relation Age of Onset   • Polyps Father    • Cancer Maternal Aunt 50     type unknown      Review of Systems:  A 10-point ROS was done with 3.4 (L) 07/11/2017   AGRATIO 1.9 07/30/2010    07/11/2017   K 4.2 07/11/2017    07/11/2017   CO2 28.0 07/11/2017     No results found for: PTT, PT, INR       Lab Results  Component Value Date   JUAN JOSE 103.6 07/11/2017   JUAN JOSE 53.4 05/23/2017     Gen possibly liver cancer as well as endometrial cancer in women. Mr. Alcocer Even family has been made aware and are undergoing testing and earlier onset colon cancer screening.   The increased risk for other cancers is not high enough to warrant any specific michael

## 2017-07-11 NOTE — PROGRESS NOTES
Education Record  Learner:  Patient  Disease / Diagnosis:   Colon cancer  Barriers / Limitations:  None  Method:  Printed material and Reinforcement  General Topics:  Plan of care reviewed; labs; schedule; chemo  Outcome:  Shows understanding and Patient g

## 2017-07-11 NOTE — PROGRESS NOTES
Pt here for MD f/u visit, h/o colon ca. Due for C5 FOLFOX today. Pt states he is feeling good overall today. Eating and drinking well and has gained weight. He did twist his ankle while playing with his grandkids, some swelling to that ankle.

## 2017-07-13 ENCOUNTER — APPOINTMENT (OUTPATIENT)
Dept: HEMATOLOGY/ONCOLOGY | Age: 67
End: 2017-07-13
Attending: INTERNAL MEDICINE
Payer: MEDICARE

## 2017-07-13 ENCOUNTER — NURSE ONLY (OUTPATIENT)
Dept: HEMATOLOGY/ONCOLOGY | Age: 67
End: 2017-07-13
Attending: INTERNAL MEDICINE
Payer: MEDICARE

## 2017-07-13 DIAGNOSIS — C18.9 COLON ADENOCARCINOMA (HCC): Primary | ICD-10-CM

## 2017-07-13 PROBLEM — Z15.89 MONOALLELIC MUTATION OF MUTYH GENE: Status: ACTIVE | Noted: 2017-07-13

## 2017-07-13 PROCEDURE — 96523 IRRIG DRUG DELIVERY DEVICE: CPT

## 2017-07-13 RX ORDER — SODIUM CHLORIDE 9 MG/ML
1000 INJECTION, SOLUTION INTRAVENOUS ONCE
Status: CANCELLED
Start: 2017-07-13 | End: 2017-07-13

## 2017-07-13 RX ORDER — SODIUM CHLORIDE 0.9 % (FLUSH) 0.9 %
10 SYRINGE (ML) INJECTION ONCE
Status: CANCELLED | OUTPATIENT
Start: 2017-07-13

## 2017-07-13 RX ORDER — SODIUM CHLORIDE 0.9 % (FLUSH) 0.9 %
10 SYRINGE (ML) INJECTION ONCE
Status: COMPLETED | OUTPATIENT
Start: 2017-07-13 | End: 2017-07-13

## 2017-07-13 RX ADMIN — SODIUM CHLORIDE 0.9 % (FLUSH) 10 ML: 0.9 % SYRINGE (ML) INJECTION at 12:15:00

## 2017-07-25 ENCOUNTER — OFFICE VISIT (OUTPATIENT)
Dept: HEMATOLOGY/ONCOLOGY | Age: 67
End: 2017-07-25
Attending: INTERNAL MEDICINE
Payer: MEDICARE

## 2017-07-25 ENCOUNTER — APPOINTMENT (OUTPATIENT)
Dept: HEMATOLOGY/ONCOLOGY | Age: 67
End: 2017-07-25
Attending: INTERNAL MEDICINE
Payer: MEDICARE

## 2017-07-25 VITALS
RESPIRATION RATE: 18 BRPM | SYSTOLIC BLOOD PRESSURE: 179 MMHG | TEMPERATURE: 98 F | OXYGEN SATURATION: 100 % | DIASTOLIC BLOOD PRESSURE: 84 MMHG | HEART RATE: 59 BPM | HEIGHT: 73.62 IN | BODY MASS INDEX: 22.39 KG/M2 | WEIGHT: 172.63 LBS

## 2017-07-25 DIAGNOSIS — T45.1X5A CHEMOTHERAPY INDUCED DIARRHEA: ICD-10-CM

## 2017-07-25 DIAGNOSIS — K52.1 CHEMOTHERAPY INDUCED DIARRHEA: ICD-10-CM

## 2017-07-25 DIAGNOSIS — D50.0 IRON DEFICIENCY ANEMIA DUE TO CHRONIC BLOOD LOSS: ICD-10-CM

## 2017-07-25 DIAGNOSIS — R11.2 CHEMOTHERAPY INDUCED NAUSEA AND VOMITING: ICD-10-CM

## 2017-07-25 DIAGNOSIS — C18.9 COLON ADENOCARCINOMA (HCC): Primary | ICD-10-CM

## 2017-07-25 DIAGNOSIS — Z15.89 MONOALLELIC MUTATION OF MUTYH GENE: ICD-10-CM

## 2017-07-25 DIAGNOSIS — T45.1X5A CHEMOTHERAPY INDUCED NAUSEA AND VOMITING: ICD-10-CM

## 2017-07-25 DIAGNOSIS — R53.83 FATIGUE DUE TO TREATMENT: ICD-10-CM

## 2017-07-25 LAB
ALBUMIN SERPL-MCNC: 3.4 G/DL (ref 3.5–4.8)
ALP LIVER SERPL-CCNC: 96 U/L (ref 45–117)
ALT SERPL-CCNC: 14 U/L (ref 17–63)
AST SERPL-CCNC: 16 U/L (ref 15–41)
BASOPHILS # BLD AUTO: 0.03 X10(3) UL (ref 0–0.1)
BASOPHILS NFR BLD AUTO: 0.4 %
BILIRUB SERPL-MCNC: 0.3 MG/DL (ref 0.1–2)
BUN BLD-MCNC: 13 MG/DL (ref 8–20)
CALCIUM BLD-MCNC: 9 MG/DL (ref 8.3–10.3)
CHLORIDE: 108 MMOL/L (ref 101–111)
CO2: 28 MMOL/L (ref 22–32)
CREAT BLD-MCNC: 0.77 MG/DL (ref 0.7–1.3)
EOSINOPHIL # BLD AUTO: 0.2 X10(3) UL (ref 0–0.3)
EOSINOPHIL NFR BLD AUTO: 2.7 %
ERYTHROCYTE [DISTWIDTH] IN BLOOD BY AUTOMATED COUNT: 18.7 % (ref 11.5–16)
GLUCOSE BLD-MCNC: 82 MG/DL (ref 70–99)
HCT VFR BLD AUTO: 35.6 % (ref 37–53)
HGB BLD-MCNC: 11.2 G/DL (ref 13–17)
IMMATURE GRANULOCYTE COUNT: 0.02 X10(3) UL (ref 0–1)
IMMATURE GRANULOCYTE RATIO %: 0.3 %
LYMPHOCYTES # BLD AUTO: 1.31 X10(3) UL (ref 0.9–4)
LYMPHOCYTES NFR BLD AUTO: 17.9 %
M PROTEIN MFR SERPL ELPH: 7.3 G/DL (ref 6.1–8.3)
MCH RBC QN AUTO: 28.4 PG (ref 27–33.2)
MCHC RBC AUTO-ENTMCNC: 31.5 G/DL (ref 31–37)
MCV RBC AUTO: 90.1 FL (ref 80–99)
MONOCYTES # BLD AUTO: 0.59 X10(3) UL (ref 0.1–0.6)
MONOCYTES NFR BLD AUTO: 8.1 %
NEUTROPHIL ABS PRELIM: 5.15 X10 (3) UL (ref 1.3–6.7)
NEUTROPHILS # BLD AUTO: 5.15 X10(3) UL (ref 1.3–6.7)
NEUTROPHILS NFR BLD AUTO: 70.6 %
PLATELET # BLD AUTO: 156 10(3)UL (ref 150–450)
POTASSIUM SERPL-SCNC: 4 MMOL/L (ref 3.6–5.1)
RBC # BLD AUTO: 3.95 X10(6)UL (ref 3.8–5.8)
RED CELL DISTRIBUTION WIDTH-SD: 61.5 FL (ref 35.1–46.3)
SODIUM SERPL-SCNC: 140 MMOL/L (ref 136–144)
WBC # BLD AUTO: 7.3 X10(3) UL (ref 4–13)

## 2017-07-25 PROCEDURE — 80053 COMPREHEN METABOLIC PANEL: CPT

## 2017-07-25 PROCEDURE — 96416 CHEMO PROLONG INFUSE W/PUMP: CPT

## 2017-07-25 PROCEDURE — 96375 TX/PRO/DX INJ NEW DRUG ADDON: CPT

## 2017-07-25 PROCEDURE — 96413 CHEMO IV INFUSION 1 HR: CPT

## 2017-07-25 PROCEDURE — 85025 COMPLETE CBC W/AUTO DIFF WBC: CPT

## 2017-07-25 PROCEDURE — 96415 CHEMO IV INFUSION ADDL HR: CPT

## 2017-07-25 PROCEDURE — 96411 CHEMO IV PUSH ADDL DRUG: CPT

## 2017-07-25 PROCEDURE — 96368 THER/DIAG CONCURRENT INF: CPT

## 2017-07-25 PROCEDURE — 99215 OFFICE O/P EST HI 40 MIN: CPT | Performed by: INTERNAL MEDICINE

## 2017-07-25 RX ORDER — PROCHLORPERAZINE MALEATE 10 MG
10 TABLET ORAL EVERY 6 HOURS PRN
Status: CANCELLED | OUTPATIENT
Start: 2017-07-25

## 2017-07-25 RX ORDER — LORAZEPAM 2 MG/ML
INJECTION INTRAMUSCULAR EVERY 4 HOURS PRN
Status: CANCELLED | OUTPATIENT
Start: 2017-07-25

## 2017-07-25 RX ORDER — FLUOROURACIL 50 MG/ML
2400 INJECTION, SOLUTION INTRAVENOUS CONTINUOUS
Status: CANCELLED | OUTPATIENT
Start: 2017-07-25

## 2017-07-25 RX ORDER — LORAZEPAM 0.5 MG/1
TABLET ORAL EVERY 4 HOURS PRN
Status: CANCELLED | OUTPATIENT
Start: 2017-07-25

## 2017-07-25 RX ORDER — SODIUM CHLORIDE 9 MG/ML
1000 INJECTION, SOLUTION INTRAVENOUS ONCE
Status: CANCELLED
Start: 2017-07-25 | End: 2017-07-25

## 2017-07-25 RX ORDER — SODIUM CHLORIDE 0.9 % (FLUSH) 0.9 %
10 SYRINGE (ML) INJECTION ONCE
Status: CANCELLED | OUTPATIENT
Start: 2017-07-25

## 2017-07-25 RX ORDER — METOCLOPRAMIDE HYDROCHLORIDE 5 MG/ML
10 INJECTION INTRAMUSCULAR; INTRAVENOUS EVERY 6 HOURS PRN
Status: CANCELLED | OUTPATIENT
Start: 2017-07-25

## 2017-07-25 RX ORDER — ONDANSETRON 2 MG/ML
8 INJECTION INTRAMUSCULAR; INTRAVENOUS EVERY 6 HOURS PRN
Status: CANCELLED | OUTPATIENT
Start: 2017-07-25

## 2017-07-25 RX ORDER — SODIUM CHLORIDE 0.9 % (FLUSH) 0.9 %
10 SYRINGE (ML) INJECTION ONCE
Status: COMPLETED | OUTPATIENT
Start: 2017-07-25 | End: 2017-07-25

## 2017-07-25 RX ORDER — FLUOROURACIL 50 MG/ML
2400 INJECTION, SOLUTION INTRAVENOUS CONTINUOUS
Status: DISCONTINUED | OUTPATIENT
Start: 2017-07-25 | End: 2017-07-25

## 2017-07-25 RX ADMIN — FLUOROURACIL 4700 MG: 50 INJECTION, SOLUTION INTRAVENOUS at 15:50:00

## 2017-07-25 RX ADMIN — SODIUM CHLORIDE 0.9 % (FLUSH) 10 ML: 0.9 % SYRINGE (ML) INJECTION at 15:49:00

## 2017-07-25 NOTE — PROGRESS NOTES
Hematology/Oncology Clinic Follow Up Visit    Patient Name: Jacques Copeland  Medical Record Number: [id number]    YOB: 1950    PCP: Dr. Leonarda Knox ph [phone]    Reason for Consultation:  Jacques Copeland was seen today for the diagnosis of started to omit 5FU boluses   -7/11/17- cycle 5 FOLFOX (delayed 1 week d/t holiday and pt preference)  -7/25/17- cycle 6 FOLFOX     =============================================  Interval events:  Presents for follow up for cycle 6 FOLFOX.   Last cycle went RaNITidine HCl 150 MG Oral Tab Take 150 mg by mouth. Disp:  Rfl:    Glucose Blood (FREESTYLE LITE TEST) In Vitro Strip  Disp:  Rfl:    Diclofenac Sodium 1 % Transdermal Gel Apply topically 4 (four) times daily.  Disp:  Rfl:    Multiple Vitamin (Syliva [place] 1143)  BSA (Calculated - sq m): 2.03 sq meters (07/25 1143)  Pulse: 59 (07/25 1143)  BP: 179/84 (07/25 1143)  Temp: 97.6 °F (36.4 °C) (07/25 1143)  Do Not Use - Resp Rate: --  SpO2: 100 % (07/25 1143)    Wt Readings from Last 6 Encounters:  07/25/17 : 78. 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 [id number]).     Imaging:    As reviewed above    Pathology:  As reviewed above      Impression & Plan:     *Colon adenocarcinoma  -stage I colon cancer follow up for [facility]. RTC in 3 weeks, will delay 1 week as pt states he has a eye doctor appt in 2 weeks that he is not willing to move and refuses to get chemo on a Monday or Wednesday instead.      Lambert Guo MD  Hematology/Medical Onc

## 2017-07-25 NOTE — PROGRESS NOTES
Pt here for MD f/u visit, h/o colon ca. Due for C6 FOLFOX today. Pt states he is eating and drinking very well. He does live in assisted living and there has been \"GI bug\" going around.  He had 1 day of increase stool output however, he believes it was du

## 2017-07-27 ENCOUNTER — NURSE ONLY (OUTPATIENT)
Dept: HEMATOLOGY/ONCOLOGY | Age: 67
End: 2017-07-27
Attending: INTERNAL MEDICINE
Payer: MEDICARE

## 2017-07-27 DIAGNOSIS — C18.9 COLON ADENOCARCINOMA (HCC): Primary | ICD-10-CM

## 2017-07-27 PROCEDURE — 96523 IRRIG DRUG DELIVERY DEVICE: CPT

## 2017-07-27 RX ORDER — SODIUM CHLORIDE 0.9 % (FLUSH) 0.9 %
10 SYRINGE (ML) INJECTION ONCE
Status: COMPLETED | OUTPATIENT
Start: 2017-07-27 | End: 2017-07-27

## 2017-07-27 RX ORDER — SODIUM CHLORIDE 9 MG/ML
1000 INJECTION, SOLUTION INTRAVENOUS ONCE
Status: CANCELLED
Start: 2017-07-27 | End: 2017-07-27

## 2017-07-27 RX ORDER — SODIUM CHLORIDE 0.9 % (FLUSH) 0.9 %
10 SYRINGE (ML) INJECTION ONCE
Status: CANCELLED | OUTPATIENT
Start: 2017-07-27

## 2017-07-27 RX ADMIN — SODIUM CHLORIDE 0.9 % (FLUSH) 10 ML: 0.9 % SYRINGE (ML) INJECTION at 13:00:00

## 2017-07-31 ENCOUNTER — SNF/IP PROF CHARGE ONLY (OUTPATIENT)
Dept: HEMATOLOGY/ONCOLOGY | Facility: HOSPITAL | Age: 67
End: 2017-07-31

## 2017-07-31 DIAGNOSIS — C18.9 COLON ADENOCARCINOMA (HCC): ICD-10-CM

## 2017-07-31 PROCEDURE — G9678 ONCOLOGY CARE MODEL SERVICE: HCPCS | Performed by: INTERNAL MEDICINE

## 2017-08-09 ENCOUNTER — TELEPHONE (OUTPATIENT)
Dept: HEMATOLOGY/ONCOLOGY | Facility: HOSPITAL | Age: 67
End: 2017-08-09

## 2017-08-09 NOTE — TELEPHONE ENCOUNTER
Patient asking if can have cataract removed or if he needs to wait until after chemo is completed. Per Dr. Michael So, patient can have removed anytime. Called to patient.

## 2017-08-15 ENCOUNTER — TELEPHONE (OUTPATIENT)
Dept: HEMATOLOGY/ONCOLOGY | Facility: HOSPITAL | Age: 67
End: 2017-08-15

## 2017-08-15 ENCOUNTER — OFFICE VISIT (OUTPATIENT)
Dept: HEMATOLOGY/ONCOLOGY | Age: 67
End: 2017-08-15
Attending: INTERNAL MEDICINE
Payer: MEDICARE

## 2017-08-15 VITALS
SYSTOLIC BLOOD PRESSURE: 163 MMHG | RESPIRATION RATE: 16 BRPM | DIASTOLIC BLOOD PRESSURE: 74 MMHG | TEMPERATURE: 98 F | WEIGHT: 179.69 LBS | HEART RATE: 61 BPM | BODY MASS INDEX: 23 KG/M2 | OXYGEN SATURATION: 99 %

## 2017-08-15 DIAGNOSIS — T45.1X5A CHEMOTHERAPY INDUCED DIARRHEA: ICD-10-CM

## 2017-08-15 DIAGNOSIS — T45.1X5A CHEMOTHERAPY INDUCED NAUSEA AND VOMITING: ICD-10-CM

## 2017-08-15 DIAGNOSIS — Z15.89 MONOALLELIC MUTATION OF MUTYH GENE: ICD-10-CM

## 2017-08-15 DIAGNOSIS — K52.1 CHEMOTHERAPY INDUCED DIARRHEA: ICD-10-CM

## 2017-08-15 DIAGNOSIS — C18.9 COLON ADENOCARCINOMA (HCC): Primary | ICD-10-CM

## 2017-08-15 DIAGNOSIS — D64.81 ANEMIA ASSOCIATED WITH CHEMOTHERAPY: ICD-10-CM

## 2017-08-15 DIAGNOSIS — R11.2 CHEMOTHERAPY INDUCED NAUSEA AND VOMITING: ICD-10-CM

## 2017-08-15 DIAGNOSIS — T45.1X5A ANEMIA ASSOCIATED WITH CHEMOTHERAPY: ICD-10-CM

## 2017-08-15 DIAGNOSIS — R53.83 FATIGUE DUE TO TREATMENT: ICD-10-CM

## 2017-08-15 LAB
ALBUMIN SERPL-MCNC: 3.4 G/DL (ref 3.5–4.8)
ALP LIVER SERPL-CCNC: 103 U/L (ref 45–117)
ALT SERPL-CCNC: 15 U/L (ref 17–63)
AST SERPL-CCNC: 16 U/L (ref 15–41)
BASOPHILS # BLD AUTO: 0.04 X10(3) UL (ref 0–0.1)
BASOPHILS NFR BLD AUTO: 0.7 %
BILIRUB SERPL-MCNC: 0.4 MG/DL (ref 0.1–2)
BUN BLD-MCNC: 14 MG/DL (ref 8–20)
CALCIUM BLD-MCNC: 8.8 MG/DL (ref 8.3–10.3)
CHLORIDE: 107 MMOL/L (ref 101–111)
CO2: 29 MMOL/L (ref 22–32)
CREAT BLD-MCNC: 0.8 MG/DL (ref 0.7–1.3)
EOSINOPHIL # BLD AUTO: 0.11 X10(3) UL (ref 0–0.3)
EOSINOPHIL NFR BLD AUTO: 1.8 %
ERYTHROCYTE [DISTWIDTH] IN BLOOD BY AUTOMATED COUNT: 17.8 % (ref 11.5–16)
GLUCOSE BLD-MCNC: 144 MG/DL (ref 70–99)
HCT VFR BLD AUTO: 37.7 % (ref 37–53)
HGB BLD-MCNC: 11.8 G/DL (ref 13–17)
IMMATURE GRANULOCYTE COUNT: 0.03 X10(3) UL (ref 0–1)
IMMATURE GRANULOCYTE RATIO %: 0.5 %
LYMPHOCYTES # BLD AUTO: 1.21 X10(3) UL (ref 0.9–4)
LYMPHOCYTES NFR BLD AUTO: 19.9 %
M PROTEIN MFR SERPL ELPH: 7.5 G/DL (ref 6.1–8.3)
MCH RBC QN AUTO: 28.7 PG (ref 27–33.2)
MCHC RBC AUTO-ENTMCNC: 31.3 G/DL (ref 31–37)
MCV RBC AUTO: 91.7 FL (ref 80–99)
MONOCYTES # BLD AUTO: 0.71 X10(3) UL (ref 0.1–0.6)
MONOCYTES NFR BLD AUTO: 11.7 %
NEUTROPHIL ABS PRELIM: 3.98 X10 (3) UL (ref 1.3–6.7)
NEUTROPHILS # BLD AUTO: 3.98 X10(3) UL (ref 1.3–6.7)
NEUTROPHILS NFR BLD AUTO: 65.4 %
PLATELET # BLD AUTO: 185 10(3)UL (ref 150–450)
POTASSIUM SERPL-SCNC: 4 MMOL/L (ref 3.6–5.1)
RBC # BLD AUTO: 4.11 X10(6)UL (ref 3.8–5.8)
RED CELL DISTRIBUTION WIDTH-SD: 59.6 FL (ref 35.1–46.3)
SODIUM SERPL-SCNC: 141 MMOL/L (ref 136–144)
WBC # BLD AUTO: 6.1 X10(3) UL (ref 4–13)

## 2017-08-15 PROCEDURE — 85025 COMPLETE CBC W/AUTO DIFF WBC: CPT

## 2017-08-15 PROCEDURE — 96413 CHEMO IV INFUSION 1 HR: CPT

## 2017-08-15 PROCEDURE — 80053 COMPREHEN METABOLIC PANEL: CPT

## 2017-08-15 PROCEDURE — 96375 TX/PRO/DX INJ NEW DRUG ADDON: CPT

## 2017-08-15 PROCEDURE — 96367 TX/PROPH/DG ADDL SEQ IV INF: CPT

## 2017-08-15 PROCEDURE — 96415 CHEMO IV INFUSION ADDL HR: CPT

## 2017-08-15 PROCEDURE — 99215 OFFICE O/P EST HI 40 MIN: CPT | Performed by: INTERNAL MEDICINE

## 2017-08-15 PROCEDURE — 96368 THER/DIAG CONCURRENT INF: CPT

## 2017-08-15 RX ORDER — ONDANSETRON 2 MG/ML
8 INJECTION INTRAMUSCULAR; INTRAVENOUS EVERY 6 HOURS PRN
Status: CANCELLED | OUTPATIENT
Start: 2017-08-15

## 2017-08-15 RX ORDER — FLUOROURACIL 50 MG/ML
2400 INJECTION, SOLUTION INTRAVENOUS CONTINUOUS
Status: DISCONTINUED | OUTPATIENT
Start: 2017-08-15 | End: 2017-08-15

## 2017-08-15 RX ORDER — METOCLOPRAMIDE HYDROCHLORIDE 5 MG/ML
10 INJECTION INTRAMUSCULAR; INTRAVENOUS EVERY 6 HOURS PRN
Status: CANCELLED | OUTPATIENT
Start: 2017-08-15

## 2017-08-15 RX ORDER — LORAZEPAM 0.5 MG/1
TABLET ORAL EVERY 4 HOURS PRN
Status: CANCELLED | OUTPATIENT
Start: 2017-08-15

## 2017-08-15 RX ORDER — LORAZEPAM 2 MG/ML
INJECTION INTRAMUSCULAR EVERY 4 HOURS PRN
Status: CANCELLED | OUTPATIENT
Start: 2017-08-15

## 2017-08-15 RX ORDER — FLUOROURACIL 50 MG/ML
2400 INJECTION, SOLUTION INTRAVENOUS CONTINUOUS
Status: CANCELLED | OUTPATIENT
Start: 2017-08-15

## 2017-08-15 RX ORDER — PROCHLORPERAZINE MALEATE 10 MG
10 TABLET ORAL EVERY 6 HOURS PRN
Status: CANCELLED | OUTPATIENT
Start: 2017-08-15

## 2017-08-15 RX ADMIN — FLUOROURACIL 4700 MG: 50 INJECTION, SOLUTION INTRAVENOUS at 14:45:00

## 2017-08-15 NOTE — TELEPHONE ENCOUNTER
Per pt, he is running out of colostomy bags early. Requested RN call Florenc[facility] to verify RX and who is ordering- if able, increase supply.  To #90 per month per pt request.  Spoke with [facility] team. Pt received 90 day supply and will be due fo

## 2017-08-15 NOTE — PROGRESS NOTES
Education Record    Learner:  Patient    Disease / Diagnosis: colon ca    Barriers / Limitations:  None   Comments:    Method:  Discussion   Comments:    General Topics:  Medication, Side effects and symptom management and Plan of care reviewed   Comments:

## 2017-08-15 NOTE — PROGRESS NOTES
Hematology/Oncology Clinic Follow Up Visit    Patient Name: Ray Parker  Medical Record Number: [id number]    YOB: 1950    PCP: Dr. Mirian Monreal ph [phone]    Reason for Consultation:  Ray Parker was seen today for the diagnosis of started to omit 5FU boluses   -7/11/17- cycle 5 FOLFOX (delayed 1 week d/t holiday and pt preference)  -7/25/17- cycle 6 FOLFOX   -8/15/17- cycle 7 FOLFOX    =============================================  Interval events:  Presents for follow up for cycle Disp: 30 tablet Rfl: 3   Prochlorperazine Maleate (COMPAZINE) 10 mg tablet Take 1 tablet (10 mg total) by mouth every 6 (six) hours as needed for Nausea. Disp: 30 tablet Rfl: 3   Metoprolol Tartrate 50 MG Oral Tab Take 50 mg by mouth.  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  Ty colon cancer screening. The increased risk for other cancers is not high enough to warrant any specific additional surveillance beyond standard for post colon cancer follow up for [facility].      RTC in 2 weeks    Tara Spicer MD  Hematology/Medical Oncology

## 2017-08-15 NOTE — PROGRESS NOTES
Pt here for MD f/u visit, h/o colon ca. Due for C7 FOLFOX today. Pt continues to take imodium daily to slow colostomy output. Taking anti-emetics for a few days after pump d/c helps. No issues with eating or drinking.   Requesting new RX for increase ostomy

## 2017-08-17 ENCOUNTER — NURSE ONLY (OUTPATIENT)
Dept: HEMATOLOGY/ONCOLOGY | Age: 67
End: 2017-08-17
Attending: INTERNAL MEDICINE
Payer: MEDICARE

## 2017-08-17 DIAGNOSIS — C18.9 COLON ADENOCARCINOMA (HCC): Primary | ICD-10-CM

## 2017-08-17 PROCEDURE — 96523 IRRIG DRUG DELIVERY DEVICE: CPT

## 2017-08-17 RX ORDER — SODIUM CHLORIDE 0.9 % (FLUSH) 0.9 %
10 SYRINGE (ML) INJECTION ONCE
Status: CANCELLED | OUTPATIENT
Start: 2017-08-17

## 2017-08-17 RX ORDER — SODIUM CHLORIDE 9 MG/ML
1000 INJECTION, SOLUTION INTRAVENOUS ONCE
Status: CANCELLED
Start: 2017-08-17 | End: 2017-08-17

## 2017-08-17 RX ORDER — SODIUM CHLORIDE 0.9 % (FLUSH) 0.9 %
10 SYRINGE (ML) INJECTION ONCE
Status: COMPLETED | OUTPATIENT
Start: 2017-08-17 | End: 2017-08-17

## 2017-08-17 RX ADMIN — SODIUM CHLORIDE 0.9 % (FLUSH) 10 ML: 0.9 % SYRINGE (ML) INJECTION at 11:30:00

## 2017-08-22 ENCOUNTER — TELEPHONE (OUTPATIENT)
Dept: HEMATOLOGY/ONCOLOGY | Facility: HOSPITAL | Age: 67
End: 2017-08-22

## 2017-08-22 NOTE — TELEPHONE ENCOUNTER
Hilaria Guzman- Dr. Ishmael Marie calling for clearance on deep teeth cleaning. Pt planning on having this done on 9/13. Pt scheduled to receive his chemo that month on 9/12/17. Msg to MD to approve.

## 2017-08-22 NOTE — TELEPHONE ENCOUNTER
Spoke with patient. Aware per MD that he prefers him not to have an unnecessary dental work such as a deep cleaning while receiving chemo. He should call of course if he develops dental pain or with any urgent issues.  Per MD he should be completing chem

## 2017-08-23 ENCOUNTER — SOCIAL WORK SERVICES (OUTPATIENT)
Dept: HEMATOLOGY/ONCOLOGY | Facility: HOSPITAL | Age: 67
End: 2017-08-23

## 2017-08-23 DIAGNOSIS — C18.9 COLON ADENOCARCINOMA (HCC): Primary | ICD-10-CM

## 2017-08-23 NOTE — PROGRESS NOTES
CHARLEEN left vm for patient. Patient stated to RN that he needs reorder of colostomy bags and he needs more than what is currently ordered and his supply company says he needs a Letter of Medical Necessity.    CHARLEEN left message for patient to call back with the

## 2017-08-29 ENCOUNTER — OFFICE VISIT (OUTPATIENT)
Dept: HEMATOLOGY/ONCOLOGY | Age: 67
End: 2017-08-29
Attending: INTERNAL MEDICINE
Payer: MEDICARE

## 2017-08-29 ENCOUNTER — SOCIAL WORK SERVICES (OUTPATIENT)
Dept: HEMATOLOGY/ONCOLOGY | Facility: HOSPITAL | Age: 67
End: 2017-08-29

## 2017-08-29 VITALS
WEIGHT: 182.31 LBS | OXYGEN SATURATION: 99 % | RESPIRATION RATE: 16 BRPM | DIASTOLIC BLOOD PRESSURE: 85 MMHG | HEART RATE: 55 BPM | BODY MASS INDEX: 24 KG/M2 | SYSTOLIC BLOOD PRESSURE: 185 MMHG | TEMPERATURE: 99 F

## 2017-08-29 VITALS — BODY MASS INDEX: 24 KG/M2 | HEIGHT: 73.62 IN

## 2017-08-29 DIAGNOSIS — R53.83 FATIGUE DUE TO TREATMENT: ICD-10-CM

## 2017-08-29 DIAGNOSIS — T45.1X5A ANEMIA ASSOCIATED WITH CHEMOTHERAPY: ICD-10-CM

## 2017-08-29 DIAGNOSIS — C18.9 COLON ADENOCARCINOMA (HCC): Primary | ICD-10-CM

## 2017-08-29 DIAGNOSIS — D64.81 ANEMIA ASSOCIATED WITH CHEMOTHERAPY: ICD-10-CM

## 2017-08-29 DIAGNOSIS — D50.0 IRON DEFICIENCY ANEMIA DUE TO CHRONIC BLOOD LOSS: ICD-10-CM

## 2017-08-29 DIAGNOSIS — R11.2 CHEMOTHERAPY INDUCED NAUSEA AND VOMITING: ICD-10-CM

## 2017-08-29 DIAGNOSIS — K52.1 CHEMOTHERAPY INDUCED DIARRHEA: ICD-10-CM

## 2017-08-29 DIAGNOSIS — G47.09 OTHER INSOMNIA: ICD-10-CM

## 2017-08-29 DIAGNOSIS — T45.1X5A CHEMOTHERAPY INDUCED NAUSEA AND VOMITING: ICD-10-CM

## 2017-08-29 DIAGNOSIS — T45.1X5A CHEMOTHERAPY INDUCED DIARRHEA: ICD-10-CM

## 2017-08-29 DIAGNOSIS — Z15.89 MONOALLELIC MUTATION OF MUTYH GENE: ICD-10-CM

## 2017-08-29 LAB
ALBUMIN SERPL-MCNC: 3.5 G/DL (ref 3.5–4.8)
ALP LIVER SERPL-CCNC: 108 U/L (ref 45–117)
ALT SERPL-CCNC: 16 U/L (ref 17–63)
AST SERPL-CCNC: 19 U/L (ref 15–41)
BASOPHILS # BLD AUTO: 0.03 X10(3) UL (ref 0–0.1)
BASOPHILS NFR BLD AUTO: 0.4 %
BILIRUB SERPL-MCNC: 0.3 MG/DL (ref 0.1–2)
BUN BLD-MCNC: 11 MG/DL (ref 8–20)
CALCIUM BLD-MCNC: 9.1 MG/DL (ref 8.3–10.3)
CHLORIDE: 107 MMOL/L (ref 101–111)
CO2: 26 MMOL/L (ref 22–32)
CREAT BLD-MCNC: 0.95 MG/DL (ref 0.7–1.3)
EOSINOPHIL # BLD AUTO: 0.18 X10(3) UL (ref 0–0.3)
EOSINOPHIL NFR BLD AUTO: 2.5 %
ERYTHROCYTE [DISTWIDTH] IN BLOOD BY AUTOMATED COUNT: 16.6 % (ref 11.5–16)
GLUCOSE BLD-MCNC: 135 MG/DL (ref 70–99)
HCT VFR BLD AUTO: 39.3 % (ref 37–53)
HGB BLD-MCNC: 12.4 G/DL (ref 13–17)
IMMATURE GRANULOCYTE COUNT: 0.03 X10(3) UL (ref 0–1)
IMMATURE GRANULOCYTE RATIO %: 0.4 %
LYMPHOCYTES # BLD AUTO: 1.26 X10(3) UL (ref 0.9–4)
LYMPHOCYTES NFR BLD AUTO: 17.5 %
M PROTEIN MFR SERPL ELPH: 7.6 G/DL (ref 6.1–8.3)
MCH RBC QN AUTO: 28.6 PG (ref 27–33.2)
MCHC RBC AUTO-ENTMCNC: 31.6 G/DL (ref 31–37)
MCV RBC AUTO: 90.8 FL (ref 80–99)
MONOCYTES # BLD AUTO: 0.41 X10(3) UL (ref 0.1–0.6)
MONOCYTES NFR BLD AUTO: 5.7 %
NEUTROPHIL ABS PRELIM: 5.27 X10 (3) UL (ref 1.3–6.7)
NEUTROPHILS # BLD AUTO: 5.27 X10(3) UL (ref 1.3–6.7)
NEUTROPHILS NFR BLD AUTO: 73.5 %
PLATELET # BLD AUTO: 133 10(3)UL (ref 150–450)
POTASSIUM SERPL-SCNC: 4.2 MMOL/L (ref 3.6–5.1)
RBC # BLD AUTO: 4.33 X10(6)UL (ref 3.8–5.8)
RED CELL DISTRIBUTION WIDTH-SD: 54.9 FL (ref 35.1–46.3)
SODIUM SERPL-SCNC: 140 MMOL/L (ref 136–144)
WBC # BLD AUTO: 7.2 X10(3) UL (ref 4–13)

## 2017-08-29 PROCEDURE — 96367 TX/PROPH/DG ADDL SEQ IV INF: CPT

## 2017-08-29 PROCEDURE — 96375 TX/PRO/DX INJ NEW DRUG ADDON: CPT

## 2017-08-29 PROCEDURE — 96413 CHEMO IV INFUSION 1 HR: CPT

## 2017-08-29 PROCEDURE — 96415 CHEMO IV INFUSION ADDL HR: CPT

## 2017-08-29 PROCEDURE — 85025 COMPLETE CBC W/AUTO DIFF WBC: CPT

## 2017-08-29 PROCEDURE — 80053 COMPREHEN METABOLIC PANEL: CPT

## 2017-08-29 PROCEDURE — 99215 OFFICE O/P EST HI 40 MIN: CPT | Performed by: INTERNAL MEDICINE

## 2017-08-29 RX ORDER — PROCHLORPERAZINE MALEATE 10 MG
10 TABLET ORAL EVERY 6 HOURS PRN
Status: CANCELLED | OUTPATIENT
Start: 2017-08-29

## 2017-08-29 RX ORDER — LORAZEPAM 2 MG/ML
INJECTION INTRAMUSCULAR EVERY 4 HOURS PRN
Status: CANCELLED | OUTPATIENT
Start: 2017-08-29

## 2017-08-29 RX ORDER — OLANZAPINE 2.5 MG/1
TABLET ORAL NIGHTLY
Qty: 60 TABLET | Refills: 3 | Status: SHIPPED | OUTPATIENT
Start: 2017-08-29

## 2017-08-29 RX ORDER — ONDANSETRON 2 MG/ML
8 INJECTION INTRAMUSCULAR; INTRAVENOUS EVERY 6 HOURS PRN
Status: CANCELLED | OUTPATIENT
Start: 2017-08-29

## 2017-08-29 RX ORDER — FLUOROURACIL 50 MG/ML
2400 INJECTION, SOLUTION INTRAVENOUS CONTINUOUS
Status: CANCELLED | OUTPATIENT
Start: 2017-08-29

## 2017-08-29 RX ORDER — METOCLOPRAMIDE HYDROCHLORIDE 5 MG/ML
10 INJECTION INTRAMUSCULAR; INTRAVENOUS EVERY 6 HOURS PRN
Status: CANCELLED | OUTPATIENT
Start: 2017-08-29

## 2017-08-29 RX ORDER — LORAZEPAM 0.5 MG/1
TABLET ORAL EVERY 4 HOURS PRN
Status: CANCELLED | OUTPATIENT
Start: 2017-08-29

## 2017-08-29 RX ORDER — FLUOROURACIL 50 MG/ML
2400 INJECTION, SOLUTION INTRAVENOUS CONTINUOUS
Status: DISCONTINUED | OUTPATIENT
Start: 2017-08-29 | End: 2017-08-29

## 2017-08-29 RX ADMIN — FLUOROURACIL 5000 MG: 50 INJECTION, SOLUTION INTRAVENOUS at 13:45:00

## 2017-08-29 NOTE — PROGRESS NOTES
Hematology/Oncology Clinic Follow Up Visit    Patient Name: Melodie Loaiza  Medical Record Number: [id number]    YOB: 1950    PCP: Dr. Johnson Cope ph [phone]    Reason for Consultation:  Melodie Loaiza was seen today for the diagnosis of started to omit 5FU boluses   -7/11/17- cycle 5 FOLFOX (delayed 1 week d/t holiday and pt preference)  -7/25/17- cycle 6 FOLFOX   -8/15/17- cycle 7 FOLFOX  -8/29/17- cycle 8 FOLFOX    =============================================  Interval events:  Present mg total) by mouth every 8 (eight) hours as needed for Nausea. Disp: 30 tablet Rfl: 3   Prochlorperazine Maleate (COMPAZINE) 10 mg tablet Take 1 tablet (10 mg total) by mouth every 6 (six) hours as needed for Nausea.  Disp: 30 tablet Rfl: 3   Metoprolol Tar Medical History:  Family History   Problem Relation Age of Onset   • Polyps Father    • Cancer Maternal Aunt 48     type unknown      Review of Systems:  A 10-point ROS was done with pertinent positives and negative per the HPI    Vital Signs:  Height: 187 BILT 0.3 08/29/2017   TP 7.6 08/29/2017   ALB 3.5 08/29/2017   AGRATIO 1.9 07/30/2010    08/29/2017   K 4.2 08/29/2017    08/29/2017   CO2 26.0 08/29/2017     No results found for: PTT, PT, INR       Lab Results  Component Value Date   JUAN JOSE 10 thus portends a moderate increased risk for colon cancer as well as gastric and possibly liver cancer as well as endometrial cancer in women. Mr. Yaya Bhat family has been made aware and are undergoing testing and earlier onset colon cancer screening.   The

## 2017-08-29 NOTE — PATIENT INSTRUCTIONS
To reach Dr Phyllis Mcdaniel triage nurse during business hours, please call [phone]. After hours, including weekends, evenings, and holidays, please call the main number [phone] for emergent needs.

## 2017-08-29 NOTE — PROGRESS NOTES
Pt here for MD f/u visit and due for C8 FOLFOX today. Pt continues to take imodium three times a day to slow colostomy output. Some nausea after pump d/c, takes anti-emetics for a few days which helps. Appetite good. Energy level fair.      Education Record

## 2017-08-29 NOTE — PROGRESS NOTES
CHARLEEN met with patient and discussed his ostomy supply situation. SW gave patient phone numbers for appointments at [address] [place] [address] vs. [place]. Patient has received his supplies, some are on back order.    Patient does have contact with home herashaun

## 2017-08-31 ENCOUNTER — NURSE ONLY (OUTPATIENT)
Dept: HEMATOLOGY/ONCOLOGY | Age: 67
End: 2017-08-31
Attending: INTERNAL MEDICINE
Payer: MEDICARE

## 2017-08-31 ENCOUNTER — SNF/IP PROF CHARGE ONLY (OUTPATIENT)
Dept: HEMATOLOGY/ONCOLOGY | Facility: HOSPITAL | Age: 67
End: 2017-08-31

## 2017-08-31 DIAGNOSIS — C18.9 COLON ADENOCARCINOMA (HCC): ICD-10-CM

## 2017-08-31 DIAGNOSIS — C18.9 COLON ADENOCARCINOMA (HCC): Primary | ICD-10-CM

## 2017-08-31 PROCEDURE — 96523 IRRIG DRUG DELIVERY DEVICE: CPT

## 2017-08-31 PROCEDURE — G9678 ONCOLOGY CARE MODEL SERVICE: HCPCS | Performed by: INTERNAL MEDICINE

## 2017-08-31 RX ORDER — SODIUM CHLORIDE 9 MG/ML
1000 INJECTION, SOLUTION INTRAVENOUS ONCE
Status: CANCELLED
Start: 2017-08-31 | End: 2017-08-31

## 2017-08-31 RX ORDER — SODIUM CHLORIDE 0.9 % (FLUSH) 0.9 %
10 SYRINGE (ML) INJECTION ONCE
Status: COMPLETED | OUTPATIENT
Start: 2017-08-31 | End: 2017-08-31

## 2017-08-31 RX ORDER — SODIUM CHLORIDE 0.9 % (FLUSH) 0.9 %
10 SYRINGE (ML) INJECTION ONCE
Status: CANCELLED | OUTPATIENT
Start: 2017-08-31

## 2017-08-31 RX ADMIN — SODIUM CHLORIDE 0.9 % (FLUSH) 10 ML: 0.9 % SYRINGE (ML) INJECTION at 14:13:00

## 2017-09-01 ENCOUNTER — APPOINTMENT (OUTPATIENT)
Dept: HEMATOLOGY/ONCOLOGY | Age: 67
End: 2017-09-01
Attending: INTERNAL MEDICINE
Payer: MEDICARE

## 2017-09-12 ENCOUNTER — OFFICE VISIT (OUTPATIENT)
Dept: HEMATOLOGY/ONCOLOGY | Age: 67
End: 2017-09-12
Attending: INTERNAL MEDICINE
Payer: MEDICARE

## 2017-09-12 VITALS
DIASTOLIC BLOOD PRESSURE: 84 MMHG | TEMPERATURE: 98 F | BODY MASS INDEX: 24.05 KG/M2 | RESPIRATION RATE: 16 BRPM | WEIGHT: 185.38 LBS | HEIGHT: 73.62 IN | SYSTOLIC BLOOD PRESSURE: 171 MMHG | HEART RATE: 54 BPM | OXYGEN SATURATION: 97 %

## 2017-09-12 DIAGNOSIS — R53.83 FATIGUE DUE TO TREATMENT: ICD-10-CM

## 2017-09-12 DIAGNOSIS — D64.81 ANEMIA ASSOCIATED WITH CHEMOTHERAPY: ICD-10-CM

## 2017-09-12 DIAGNOSIS — R11.2 CHEMOTHERAPY INDUCED NAUSEA AND VOMITING: ICD-10-CM

## 2017-09-12 DIAGNOSIS — C18.9 COLON ADENOCARCINOMA (HCC): Primary | ICD-10-CM

## 2017-09-12 DIAGNOSIS — K52.1 CHEMOTHERAPY INDUCED DIARRHEA: ICD-10-CM

## 2017-09-12 DIAGNOSIS — G47.09 OTHER INSOMNIA: ICD-10-CM

## 2017-09-12 DIAGNOSIS — T45.1X5A ANEMIA ASSOCIATED WITH CHEMOTHERAPY: ICD-10-CM

## 2017-09-12 DIAGNOSIS — T45.1X5A CHEMOTHERAPY INDUCED DIARRHEA: ICD-10-CM

## 2017-09-12 DIAGNOSIS — Z15.89 MONOALLELIC MUTATION OF MUTYH GENE: ICD-10-CM

## 2017-09-12 DIAGNOSIS — T45.1X5A CHEMOTHERAPY INDUCED NAUSEA AND VOMITING: ICD-10-CM

## 2017-09-12 LAB
ALBUMIN SERPL-MCNC: 3.3 G/DL (ref 3.5–4.8)
ALP LIVER SERPL-CCNC: 123 U/L (ref 45–117)
ALT SERPL-CCNC: 21 U/L (ref 17–63)
AST SERPL-CCNC: 20 U/L (ref 15–41)
BASOPHILS # BLD AUTO: 0.02 X10(3) UL (ref 0–0.1)
BASOPHILS NFR BLD AUTO: 0.3 %
BILIRUB SERPL-MCNC: 0.3 MG/DL (ref 0.1–2)
BUN BLD-MCNC: 9 MG/DL (ref 8–20)
CALCIUM BLD-MCNC: 8.8 MG/DL (ref 8.3–10.3)
CHLORIDE: 108 MMOL/L (ref 101–111)
CO2: 28 MMOL/L (ref 22–32)
CREAT BLD-MCNC: 0.88 MG/DL (ref 0.7–1.3)
EOSINOPHIL # BLD AUTO: 0.2 X10(3) UL (ref 0–0.3)
EOSINOPHIL NFR BLD AUTO: 3.5 %
ERYTHROCYTE [DISTWIDTH] IN BLOOD BY AUTOMATED COUNT: 16.2 % (ref 11.5–16)
GLUCOSE BLD-MCNC: 186 MG/DL (ref 70–99)
HCT VFR BLD AUTO: 38.6 % (ref 37–53)
HGB BLD-MCNC: 12.3 G/DL (ref 13–17)
IMMATURE GRANULOCYTE COUNT: 0.02 X10(3) UL (ref 0–1)
IMMATURE GRANULOCYTE RATIO %: 0.3 %
LYMPHOCYTES # BLD AUTO: 1.16 X10(3) UL (ref 0.9–4)
LYMPHOCYTES NFR BLD AUTO: 20.1 %
M PROTEIN MFR SERPL ELPH: 7.4 G/DL (ref 6.1–8.3)
MCH RBC QN AUTO: 28.8 PG (ref 27–33.2)
MCHC RBC AUTO-ENTMCNC: 31.9 G/DL (ref 31–37)
MCV RBC AUTO: 90.4 FL (ref 80–99)
MONOCYTES # BLD AUTO: 0.49 X10(3) UL (ref 0.1–0.6)
MONOCYTES NFR BLD AUTO: 8.5 %
NEUTROPHIL ABS PRELIM: 3.88 X10 (3) UL (ref 1.3–6.7)
NEUTROPHILS # BLD AUTO: 3.88 X10(3) UL (ref 1.3–6.7)
NEUTROPHILS NFR BLD AUTO: 67.3 %
PLATELET # BLD AUTO: 95 10(3)UL (ref 150–450)
POTASSIUM SERPL-SCNC: 3.8 MMOL/L (ref 3.6–5.1)
RBC # BLD AUTO: 4.27 X10(6)UL (ref 3.8–5.8)
RED CELL DISTRIBUTION WIDTH-SD: 52.9 FL (ref 35.1–46.3)
SODIUM SERPL-SCNC: 142 MMOL/L (ref 136–144)
WBC # BLD AUTO: 5.8 X10(3) UL (ref 4–13)

## 2017-09-12 PROCEDURE — 85025 COMPLETE CBC W/AUTO DIFF WBC: CPT

## 2017-09-12 PROCEDURE — 96413 CHEMO IV INFUSION 1 HR: CPT

## 2017-09-12 PROCEDURE — 96367 TX/PROPH/DG ADDL SEQ IV INF: CPT

## 2017-09-12 PROCEDURE — 96415 CHEMO IV INFUSION ADDL HR: CPT

## 2017-09-12 PROCEDURE — 80053 COMPREHEN METABOLIC PANEL: CPT

## 2017-09-12 PROCEDURE — 96368 THER/DIAG CONCURRENT INF: CPT

## 2017-09-12 PROCEDURE — 99215 OFFICE O/P EST HI 40 MIN: CPT | Performed by: INTERNAL MEDICINE

## 2017-09-12 PROCEDURE — 96375 TX/PRO/DX INJ NEW DRUG ADDON: CPT

## 2017-09-12 RX ORDER — PROCHLORPERAZINE MALEATE 10 MG
10 TABLET ORAL EVERY 6 HOURS PRN
Status: CANCELLED | OUTPATIENT
Start: 2017-09-12

## 2017-09-12 RX ORDER — LORAZEPAM 2 MG/ML
INJECTION INTRAMUSCULAR EVERY 4 HOURS PRN
Status: CANCELLED | OUTPATIENT
Start: 2017-09-12

## 2017-09-12 RX ORDER — METOCLOPRAMIDE HYDROCHLORIDE 5 MG/ML
10 INJECTION INTRAMUSCULAR; INTRAVENOUS EVERY 6 HOURS PRN
Status: CANCELLED | OUTPATIENT
Start: 2017-09-12

## 2017-09-12 RX ORDER — FLUOROURACIL 50 MG/ML
2400 INJECTION, SOLUTION INTRAVENOUS CONTINUOUS
Status: CANCELLED | OUTPATIENT
Start: 2017-09-12

## 2017-09-12 RX ORDER — ONDANSETRON 2 MG/ML
8 INJECTION INTRAMUSCULAR; INTRAVENOUS EVERY 6 HOURS PRN
Status: CANCELLED | OUTPATIENT
Start: 2017-09-12

## 2017-09-12 RX ORDER — FLUOROURACIL 50 MG/ML
2400 INJECTION, SOLUTION INTRAVENOUS CONTINUOUS
Status: DISCONTINUED | OUTPATIENT
Start: 2017-09-12 | End: 2017-09-12

## 2017-09-12 RX ORDER — LORAZEPAM 0.5 MG/1
TABLET ORAL EVERY 4 HOURS PRN
Status: CANCELLED | OUTPATIENT
Start: 2017-09-12

## 2017-09-12 RX ADMIN — FLUOROURACIL 5000 MG: 50 INJECTION, SOLUTION INTRAVENOUS at 13:28:00

## 2017-09-12 NOTE — PROGRESS NOTES
Hematology/Oncology Clinic Follow Up Visit    Patient Name: Dale Shepherd  Medical Record Number: [id number]    YOB: 1950    PCP: Dr. Maico Shay ph [phone]    Reason for Consultation:  Dale Shepherd was seen today for the diagnosis of started to omit 5FU boluses   -7/11/17- cycle 5 FOLFOX (delayed 1 week d/t holiday and pt preference)  -7/25/17- cycle 6 FOLFOX   -8/15/17- cycle 7 FOLFOX  -8/29/17- cycle 8 FOLFOX  -9/12/17- cycle 9 FOLFOX    ============================================= loose stool and then 1 tablet after every subsequent loose stool up to 16 mg (8 tablets) daily. Disp: 30 tablet Rfl: 0   Ondansetron HCl (ZOFRAN) 8 MG tablet Take 1 tablet (8 mg total) by mouth every 8 (eight) hours as needed for Nausea.  Disp: 30 tablet Rf occasionally- rare. No history of excessive use    Drug use: No    Sexual activity: Not on file     Other Topics Concern   None on file     Social History Narrative     1998- has a son and daughter who live locally. Has 6 grandchildren.   Lives in [place] (L) 08/29/2017   .0 08/15/2017       Lab Results  Component Value Date    (H) 09/12/2017   BUN 9 09/12/2017   CREATSERUM 0.88 09/12/2017   CREATSERUM 0.95 08/29/2017   CREATSERUM 0.80 08/15/2017   GFR 88 09/12/2017   CA 8.8 09/12/2017 related to chemotherapy  -cont imodium prn    *fatigue due to treatment  -dose reduction as above has helped, will continue at current dose level going forward    *anemia- YON and chemotherapy induced  -improved with INFeD 1000mg IV given on 5/31/17  -anem

## 2017-09-12 NOTE — PROGRESS NOTES
Patient here for MD f/u and treatment. States feeling well, sleeping better at night. Occasional diarrhea, controlled with imodium. Denies pain. Denies nausea/emesis.     Gave patient phone number again for [facility] to be seen and have them take car

## 2017-09-12 NOTE — PATIENT INSTRUCTIONS
For Dr. Viviane Bourgeois nurse line, call [phone] with any questions or concerns Monday through Friday 8:00 to 4:30. After hours or weekends for emergent needs [phone].

## 2017-09-14 ENCOUNTER — NURSE ONLY (OUTPATIENT)
Dept: HEMATOLOGY/ONCOLOGY | Age: 67
End: 2017-09-14
Attending: INTERNAL MEDICINE
Payer: MEDICARE

## 2017-09-14 DIAGNOSIS — C18.9 COLON ADENOCARCINOMA (HCC): Primary | ICD-10-CM

## 2017-09-14 PROCEDURE — 36591 DRAW BLOOD OFF VENOUS DEVICE: CPT

## 2017-09-14 PROCEDURE — 96523 IRRIG DRUG DELIVERY DEVICE: CPT

## 2017-09-14 RX ORDER — SODIUM CHLORIDE 0.9 % (FLUSH) 0.9 %
10 SYRINGE (ML) INJECTION ONCE
Status: CANCELLED | OUTPATIENT
Start: 2017-09-14

## 2017-09-14 RX ORDER — SODIUM CHLORIDE 0.9 % (FLUSH) 0.9 %
10 SYRINGE (ML) INJECTION ONCE
Status: COMPLETED | OUTPATIENT
Start: 2017-09-14 | End: 2017-09-14

## 2017-09-14 RX ORDER — SODIUM CHLORIDE 9 MG/ML
1000 INJECTION, SOLUTION INTRAVENOUS ONCE
Status: CANCELLED
Start: 2017-09-14 | End: 2017-09-14

## 2017-09-14 RX ADMIN — SODIUM CHLORIDE 0.9 % (FLUSH) 10 ML: 0.9 % SYRINGE (ML) INJECTION at 10:44:00

## 2017-09-26 ENCOUNTER — APPOINTMENT (OUTPATIENT)
Dept: HEMATOLOGY/ONCOLOGY | Age: 67
End: 2017-09-26
Attending: INTERNAL MEDICINE
Payer: MEDICARE

## 2017-09-26 ENCOUNTER — OFFICE VISIT (OUTPATIENT)
Dept: HEMATOLOGY/ONCOLOGY | Age: 67
End: 2017-09-26
Attending: INTERNAL MEDICINE
Payer: MEDICARE

## 2017-09-26 VITALS
SYSTOLIC BLOOD PRESSURE: 155 MMHG | BODY MASS INDEX: 24 KG/M2 | RESPIRATION RATE: 16 BRPM | HEART RATE: 53 BPM | TEMPERATURE: 98 F | OXYGEN SATURATION: 98 % | WEIGHT: 185 LBS | DIASTOLIC BLOOD PRESSURE: 75 MMHG

## 2017-09-26 DIAGNOSIS — D50.0 IRON DEFICIENCY ANEMIA DUE TO CHRONIC BLOOD LOSS: ICD-10-CM

## 2017-09-26 DIAGNOSIS — K52.1 CHEMOTHERAPY INDUCED DIARRHEA: ICD-10-CM

## 2017-09-26 DIAGNOSIS — C18.9 COLON ADENOCARCINOMA (HCC): Primary | ICD-10-CM

## 2017-09-26 DIAGNOSIS — T45.1X5A CHEMOTHERAPY INDUCED NAUSEA AND VOMITING: ICD-10-CM

## 2017-09-26 DIAGNOSIS — R11.2 CHEMOTHERAPY INDUCED NAUSEA AND VOMITING: ICD-10-CM

## 2017-09-26 DIAGNOSIS — T45.1X5A CHEMOTHERAPY INDUCED DIARRHEA: ICD-10-CM

## 2017-09-26 DIAGNOSIS — R53.83 FATIGUE DUE TO TREATMENT: ICD-10-CM

## 2017-09-26 LAB
ALBUMIN SERPL-MCNC: 3.4 G/DL (ref 3.5–4.8)
ALP LIVER SERPL-CCNC: 120 U/L (ref 45–117)
ALT SERPL-CCNC: 24 U/L (ref 17–63)
AST SERPL-CCNC: 23 U/L (ref 15–41)
BASOPHILS # BLD AUTO: 0.03 X10(3) UL (ref 0–0.1)
BASOPHILS NFR BLD AUTO: 0.4 %
BILIRUB SERPL-MCNC: 0.4 MG/DL (ref 0.1–2)
BUN BLD-MCNC: 14 MG/DL (ref 8–20)
CALCIUM BLD-MCNC: 9 MG/DL (ref 8.3–10.3)
CHLORIDE: 106 MMOL/L (ref 101–111)
CO2: 28 MMOL/L (ref 22–32)
CREAT BLD-MCNC: 0.94 MG/DL (ref 0.7–1.3)
EOSINOPHIL # BLD AUTO: 0.16 X10(3) UL (ref 0–0.3)
EOSINOPHIL NFR BLD AUTO: 2.3 %
ERYTHROCYTE [DISTWIDTH] IN BLOOD BY AUTOMATED COUNT: 16.2 % (ref 11.5–16)
GLUCOSE BLD-MCNC: 159 MG/DL (ref 70–99)
HCT VFR BLD AUTO: 40.8 % (ref 37–53)
HGB BLD-MCNC: 13.1 G/DL (ref 13–17)
IMMATURE GRANULOCYTE COUNT: 0.02 X10(3) UL (ref 0–1)
IMMATURE GRANULOCYTE RATIO %: 0.3 %
LYMPHOCYTES # BLD AUTO: 1.18 X10(3) UL (ref 0.9–4)
LYMPHOCYTES NFR BLD AUTO: 17.3 %
M PROTEIN MFR SERPL ELPH: 7.6 G/DL (ref 6.1–8.3)
MCH RBC QN AUTO: 29 PG (ref 27–33.2)
MCHC RBC AUTO-ENTMCNC: 32.1 G/DL (ref 31–37)
MCV RBC AUTO: 90.3 FL (ref 80–99)
MONOCYTES # BLD AUTO: 0.5 X10(3) UL (ref 0.1–0.6)
MONOCYTES NFR BLD AUTO: 7.3 %
NEUTROPHIL ABS PRELIM: 4.95 X10 (3) UL (ref 1.3–6.7)
NEUTROPHILS # BLD AUTO: 4.95 X10(3) UL (ref 1.3–6.7)
NEUTROPHILS NFR BLD AUTO: 72.4 %
PLATELET # BLD AUTO: 115 10(3)UL (ref 150–450)
POTASSIUM SERPL-SCNC: 3.9 MMOL/L (ref 3.6–5.1)
RBC # BLD AUTO: 4.52 X10(6)UL (ref 3.8–5.8)
RED CELL DISTRIBUTION WIDTH-SD: 52.8 FL (ref 35.1–46.3)
SODIUM SERPL-SCNC: 140 MMOL/L (ref 136–144)
WBC # BLD AUTO: 6.8 X10(3) UL (ref 4–13)

## 2017-09-26 PROCEDURE — 96415 CHEMO IV INFUSION ADDL HR: CPT

## 2017-09-26 PROCEDURE — 80053 COMPREHEN METABOLIC PANEL: CPT

## 2017-09-26 PROCEDURE — 85025 COMPLETE CBC W/AUTO DIFF WBC: CPT

## 2017-09-26 PROCEDURE — 99215 OFFICE O/P EST HI 40 MIN: CPT | Performed by: INTERNAL MEDICINE

## 2017-09-26 PROCEDURE — 96375 TX/PRO/DX INJ NEW DRUG ADDON: CPT

## 2017-09-26 PROCEDURE — 96367 TX/PROPH/DG ADDL SEQ IV INF: CPT

## 2017-09-26 PROCEDURE — 96413 CHEMO IV INFUSION 1 HR: CPT

## 2017-09-26 RX ORDER — FLUOROURACIL 50 MG/ML
2400 INJECTION, SOLUTION INTRAVENOUS CONTINUOUS
Status: DISCONTINUED | OUTPATIENT
Start: 2017-09-26 | End: 2017-09-26

## 2017-09-26 RX ORDER — LORAZEPAM 0.5 MG/1
TABLET ORAL EVERY 4 HOURS PRN
Status: CANCELLED | OUTPATIENT
Start: 2017-09-26

## 2017-09-26 RX ORDER — METOCLOPRAMIDE HYDROCHLORIDE 5 MG/ML
10 INJECTION INTRAMUSCULAR; INTRAVENOUS EVERY 6 HOURS PRN
Status: CANCELLED | OUTPATIENT
Start: 2017-09-26

## 2017-09-26 RX ORDER — ONDANSETRON 2 MG/ML
8 INJECTION INTRAMUSCULAR; INTRAVENOUS EVERY 6 HOURS PRN
Status: CANCELLED | OUTPATIENT
Start: 2017-09-26

## 2017-09-26 RX ORDER — BENAZEPRIL HYDROCHLORIDE 10 MG/1
10 TABLET ORAL DAILY
Status: ON HOLD | COMMUNITY
End: 2018-05-02

## 2017-09-26 RX ORDER — FLUOROURACIL 50 MG/ML
2400 INJECTION, SOLUTION INTRAVENOUS CONTINUOUS
Status: CANCELLED | OUTPATIENT
Start: 2017-09-26

## 2017-09-26 RX ORDER — LORAZEPAM 2 MG/ML
INJECTION INTRAMUSCULAR EVERY 4 HOURS PRN
Status: CANCELLED | OUTPATIENT
Start: 2017-09-26

## 2017-09-26 RX ORDER — PROCHLORPERAZINE MALEATE 10 MG
10 TABLET ORAL EVERY 6 HOURS PRN
Status: CANCELLED | OUTPATIENT
Start: 2017-09-26

## 2017-09-26 RX ADMIN — FLUOROURACIL 5000 MG: 50 INJECTION, SOLUTION INTRAVENOUS at 13:22:00

## 2017-09-26 NOTE — PATIENT INSTRUCTIONS
For Dr. Mark Mcallister nurse line, call [phone] with any questions or concerns Monday through Friday 8:00 to 4:30. After hours or weekends for emergent needs [phone].

## 2017-09-26 NOTE — PROGRESS NOTES
Patient here for MD f/u. C/O slight fatigue, denies SOB. Some nausea after treatment, controlled with antiemetics. Some diarrhea, controlled with imodium.

## 2017-09-26 NOTE — PATIENT INSTRUCTIONS
To reach Dr Grayson Heart triage nurse during business hours, please call [phone]. After hours, including weekends, evenings, and holidays, please call the main number [phone] for emergent needs.

## 2017-09-26 NOTE — PROGRESS NOTES
Hematology/Oncology Clinic Follow Up Visit    Patient Name: William Virgen  Medical Record Number: [id number]    YOB: 1950    PCP: Dr. Aram Rg ph [phone]    Reason for Consultation:  William Virgen was seen today for the diagnosis of started to omit 5FU boluses   -7/11/17- cycle 5 FOLFOX (delayed 1 week d/t holiday and pt preference)  -7/25/17- cycle 6 FOLFOX   -8/15/17- cycle 7 FOLFOX  -8/29/17- cycle 8 FOLFOX  -9/12/17- cycle 9 FOLFOX  -9/26/17- cycle 10 FOLFOX    =================== tablet (2 mg total) by mouth as needed for Diarrhea. Take 2 tablets after first loose stool and then 1 tablet after every subsequent loose stool up to 16 mg (8 tablets) daily.  Disp: 30 tablet Rfl: 0   Ondansetron HCl (ZOFRAN) 8 MG tablet Take 1 tablet (8 m ocassional cigar - quit 25 years ago    Alcohol use Yes  0.0 oz/week     Comment: occasionally- rare.   No history of excessive use    Drug use: No    Sexual activity: Not on file     Other Topics Concern   None on file     Social History Narrative    Gerda Ugarte 09/26/2017   PLT 95.0 (L) 09/12/2017   .0 (L) 08/29/2017       Lab Results  Component Value Date    (H) 09/26/2017   BUN 14 09/26/2017   CREATSERUM 0.94 09/26/2017   CREATSERUM 0.88 09/12/2017   CREATSERUM 0.95 08/29/2017   GFR 82 09/26/2017 night    *diarrhea- related to chemotherapy  -cont imodium prn    *fatigue due to treatment  -dose reduction as above has helped, will continue at current dose level going forward    *anemia- YON and chemotherapy induced  -improved with INFeD 1000mg IV giv

## 2017-09-27 ENCOUNTER — TELEPHONE (OUTPATIENT)
Dept: HEMATOLOGY/ONCOLOGY | Facility: HOSPITAL | Age: 67
End: 2017-09-27

## 2017-09-28 ENCOUNTER — NURSE ONLY (OUTPATIENT)
Dept: HEMATOLOGY/ONCOLOGY | Age: 67
End: 2017-09-28
Attending: INTERNAL MEDICINE
Payer: MEDICARE

## 2017-09-28 DIAGNOSIS — C18.9 COLON ADENOCARCINOMA (HCC): Primary | ICD-10-CM

## 2017-09-28 PROCEDURE — 96523 IRRIG DRUG DELIVERY DEVICE: CPT

## 2017-09-28 RX ORDER — SODIUM CHLORIDE 9 MG/ML
1000 INJECTION, SOLUTION INTRAVENOUS ONCE
Status: CANCELLED
Start: 2017-09-28 | End: 2017-09-28

## 2017-09-28 RX ORDER — SODIUM CHLORIDE 0.9 % (FLUSH) 0.9 %
10 SYRINGE (ML) INJECTION ONCE
Status: CANCELLED | OUTPATIENT
Start: 2017-09-28

## 2017-09-28 RX ORDER — SODIUM CHLORIDE 0.9 % (FLUSH) 0.9 %
10 SYRINGE (ML) INJECTION ONCE
Status: COMPLETED | OUTPATIENT
Start: 2017-09-28 | End: 2017-09-28

## 2017-09-28 RX ADMIN — SODIUM CHLORIDE 0.9 % (FLUSH) 10 ML: 0.9 % SYRINGE (ML) INJECTION at 13:00:00

## 2017-09-30 ENCOUNTER — SNF/IP PROF CHARGE ONLY (OUTPATIENT)
Dept: HEMATOLOGY/ONCOLOGY | Facility: HOSPITAL | Age: 67
End: 2017-09-30

## 2017-09-30 DIAGNOSIS — C18.9 COLON ADENOCARCINOMA (HCC): ICD-10-CM

## 2017-09-30 PROCEDURE — G9678 ONCOLOGY CARE MODEL SERVICE: HCPCS | Performed by: INTERNAL MEDICINE

## 2017-10-10 ENCOUNTER — OFFICE VISIT (OUTPATIENT)
Dept: HEMATOLOGY/ONCOLOGY | Age: 67
End: 2017-10-10
Attending: INTERNAL MEDICINE
Payer: MEDICARE

## 2017-10-10 VITALS
TEMPERATURE: 98 F | HEART RATE: 55 BPM | BODY MASS INDEX: 24 KG/M2 | OXYGEN SATURATION: 97 % | SYSTOLIC BLOOD PRESSURE: 171 MMHG | RESPIRATION RATE: 16 BRPM | DIASTOLIC BLOOD PRESSURE: 82 MMHG | WEIGHT: 187.63 LBS

## 2017-10-10 DIAGNOSIS — T45.1X5A ANEMIA ASSOCIATED WITH CHEMOTHERAPY: ICD-10-CM

## 2017-10-10 DIAGNOSIS — T45.1X5A CHEMOTHERAPY INDUCED DIARRHEA: ICD-10-CM

## 2017-10-10 DIAGNOSIS — K52.1 CHEMOTHERAPY INDUCED DIARRHEA: ICD-10-CM

## 2017-10-10 DIAGNOSIS — G47.09 OTHER INSOMNIA: ICD-10-CM

## 2017-10-10 DIAGNOSIS — C18.9 COLON ADENOCARCINOMA (HCC): Primary | ICD-10-CM

## 2017-10-10 DIAGNOSIS — R53.83 FATIGUE DUE TO TREATMENT: ICD-10-CM

## 2017-10-10 DIAGNOSIS — T45.1X5A CHEMOTHERAPY INDUCED NAUSEA AND VOMITING: ICD-10-CM

## 2017-10-10 DIAGNOSIS — D64.81 ANEMIA ASSOCIATED WITH CHEMOTHERAPY: ICD-10-CM

## 2017-10-10 DIAGNOSIS — R11.2 CHEMOTHERAPY INDUCED NAUSEA AND VOMITING: ICD-10-CM

## 2017-10-10 DIAGNOSIS — D69.6 THROMBOCYTOPENIA (HCC): ICD-10-CM

## 2017-10-10 DIAGNOSIS — D50.0 IRON DEFICIENCY ANEMIA DUE TO CHRONIC BLOOD LOSS: ICD-10-CM

## 2017-10-10 PROCEDURE — 99215 OFFICE O/P EST HI 40 MIN: CPT | Performed by: INTERNAL MEDICINE

## 2017-10-10 PROCEDURE — 96413 CHEMO IV INFUSION 1 HR: CPT

## 2017-10-10 PROCEDURE — 96415 CHEMO IV INFUSION ADDL HR: CPT

## 2017-10-10 PROCEDURE — 96368 THER/DIAG CONCURRENT INF: CPT

## 2017-10-10 PROCEDURE — 96375 TX/PRO/DX INJ NEW DRUG ADDON: CPT

## 2017-10-10 PROCEDURE — 80053 COMPREHEN METABOLIC PANEL: CPT

## 2017-10-10 PROCEDURE — 85025 COMPLETE CBC W/AUTO DIFF WBC: CPT

## 2017-10-10 PROCEDURE — 96367 TX/PROPH/DG ADDL SEQ IV INF: CPT

## 2017-10-10 RX ORDER — LORAZEPAM 2 MG/ML
INJECTION INTRAMUSCULAR EVERY 4 HOURS PRN
Status: CANCELLED | OUTPATIENT
Start: 2017-10-10

## 2017-10-10 RX ORDER — ONDANSETRON 2 MG/ML
8 INJECTION INTRAMUSCULAR; INTRAVENOUS EVERY 6 HOURS PRN
Status: CANCELLED | OUTPATIENT
Start: 2017-10-10

## 2017-10-10 RX ORDER — FLUOROURACIL 50 MG/ML
2400 INJECTION, SOLUTION INTRAVENOUS CONTINUOUS
Status: DISCONTINUED | OUTPATIENT
Start: 2017-10-10 | End: 2017-10-10

## 2017-10-10 RX ORDER — FLUOROURACIL 50 MG/ML
2400 INJECTION, SOLUTION INTRAVENOUS CONTINUOUS
Status: CANCELLED | OUTPATIENT
Start: 2017-10-10

## 2017-10-10 RX ORDER — LORAZEPAM 0.5 MG/1
TABLET ORAL EVERY 4 HOURS PRN
Status: CANCELLED | OUTPATIENT
Start: 2017-10-10

## 2017-10-10 RX ORDER — PROCHLORPERAZINE MALEATE 10 MG
10 TABLET ORAL EVERY 6 HOURS PRN
Status: CANCELLED | OUTPATIENT
Start: 2017-10-10

## 2017-10-10 RX ORDER — METOCLOPRAMIDE HYDROCHLORIDE 5 MG/ML
10 INJECTION INTRAMUSCULAR; INTRAVENOUS EVERY 6 HOURS PRN
Status: CANCELLED | OUTPATIENT
Start: 2017-10-10

## 2017-10-10 RX ADMIN — FLUOROURACIL 5000 MG: 50 INJECTION, SOLUTION INTRAVENOUS at 14:05:00

## 2017-10-10 NOTE — PATIENT INSTRUCTIONS
For Dr. Maggie Hicks nurse line, call [phone] with any questions or concerns Monday through Friday 8:00 to 4:30. After hours or weekends for emergent needs [phone].

## 2017-10-10 NOTE — PROGRESS NOTES
Reviewed labs with Dr. Fe Navas, ok to give chemo today with platelet count of 87. Also discussed with patient.

## 2017-10-10 NOTE — PROGRESS NOTES
Hematology/Oncology Clinic Follow Up Visit    Patient Name: Gabriel Victor  Medical Record Number: [id number]    YOB: 1950    PCP: Dr. Gianni Dowling ph [phone]    Reason for Consultation:  Gabriel Victor was seen today for the diagnosis of started to omit 5FU boluses   -7/11/17- cycle 5 FOLFOX (delayed 1 week d/t holiday and pt preference)  -7/25/17- cycle 6 FOLFOX   -8/15/17- cycle 7 FOLFOX  -8/29/17- cycle 8 FOLFOX  -9/12/17- cycle 9 FOLFOX  -9/26/17- cycle 10 FOLFOX  -10/10/17- cycle 11 F Take 100 mg by mouth daily. Disp:  Rfl:    Loperamide HCl 2 MG Oral Tab Take 1 tablet (2 mg total) by mouth as needed for Diarrhea. Take 2 tablets after first loose stool and then 1 tablet after every subsequent loose stool up to 16 mg (8 tablets) daily.  D Cigarettes    Quit date: 1/1/1983    Smokeless tobacco: Not on file    Comment: ocassional cigar - quit 25 years ago    Alcohol use Yes  0.0 oz/week     Comment: occasionally- rare.   No history of excessive use    Drug use: No    Sexual activity: Not on fi 29.5 10/10/2017   MCHC 32.8 10/10/2017   RDW 16.7 (H) 10/10/2017   PLT 87.0 (L) 10/10/2017   .0 (L) 09/26/2017   PLT 95.0 (L) 09/12/2017       Lab Results  Component Value Date    (H) 10/10/2017   BUN 14 10/10/2017   CREATSERUM 1.00 10/10/201 bedtime    *insomnia  -improved with olanzapine    *diarrhea- related to chemotherapy  -cont imodium prn    *fatigue due to treatment  -dose reduction as above has helped, will continue at current dose level going forward    *anemia- YON and chemotherapy i

## 2017-10-10 NOTE — PROGRESS NOTES
Patient here for MD f/u and treatment. C/O some fatigue but denies SOB. Occasional diarrhea, controlled with imodium. Occasional nausea, controlled with antiemetic. Denies pain.

## 2017-10-12 ENCOUNTER — NURSE ONLY (OUTPATIENT)
Dept: HEMATOLOGY/ONCOLOGY | Age: 67
End: 2017-10-12
Attending: INTERNAL MEDICINE
Payer: MEDICARE

## 2017-10-12 DIAGNOSIS — C18.9 COLON ADENOCARCINOMA (HCC): Primary | ICD-10-CM

## 2017-10-12 PROCEDURE — 96523 IRRIG DRUG DELIVERY DEVICE: CPT

## 2017-10-12 RX ORDER — SODIUM CHLORIDE 9 MG/ML
1000 INJECTION, SOLUTION INTRAVENOUS ONCE
Status: CANCELLED
Start: 2017-10-12 | End: 2017-10-12

## 2017-10-12 RX ORDER — SODIUM CHLORIDE 0.9 % (FLUSH) 0.9 %
10 SYRINGE (ML) INJECTION ONCE
Status: COMPLETED | OUTPATIENT
Start: 2017-10-12 | End: 2017-10-12

## 2017-10-12 RX ORDER — SODIUM CHLORIDE 0.9 % (FLUSH) 0.9 %
10 SYRINGE (ML) INJECTION ONCE
Status: CANCELLED | OUTPATIENT
Start: 2017-10-12

## 2017-10-12 RX ADMIN — SODIUM CHLORIDE 0.9 % (FLUSH) 10 ML: 0.9 % SYRINGE (ML) INJECTION at 11:36:00

## 2017-10-23 ENCOUNTER — TELEPHONE (OUTPATIENT)
Dept: HEMATOLOGY/ONCOLOGY | Facility: HOSPITAL | Age: 67
End: 2017-10-23

## 2017-10-23 NOTE — TELEPHONE ENCOUNTER
Skylar Saucedo hygentist from Dr. Hebert Nurse office calling regarding gum cleaning. Aware that patient is scheduled to receive chemo tomorrow. Will forward message to RN pool for MD to review tomorrow.

## 2017-10-24 ENCOUNTER — OFFICE VISIT (OUTPATIENT)
Dept: HEMATOLOGY/ONCOLOGY | Age: 67
End: 2017-10-24
Attending: INTERNAL MEDICINE
Payer: MEDICARE

## 2017-10-24 VITALS
DIASTOLIC BLOOD PRESSURE: 89 MMHG | TEMPERATURE: 97 F | BODY MASS INDEX: 25 KG/M2 | WEIGHT: 190 LBS | RESPIRATION RATE: 18 BRPM | HEART RATE: 63 BPM | SYSTOLIC BLOOD PRESSURE: 191 MMHG

## 2017-10-24 DIAGNOSIS — T45.1X5A ANEMIA ASSOCIATED WITH CHEMOTHERAPY: ICD-10-CM

## 2017-10-24 DIAGNOSIS — C18.9 COLON ADENOCARCINOMA (HCC): Primary | ICD-10-CM

## 2017-10-24 DIAGNOSIS — D69.6 THROMBOCYTOPENIA (HCC): ICD-10-CM

## 2017-10-24 DIAGNOSIS — T45.1X5A CHEMOTHERAPY INDUCED DIARRHEA: ICD-10-CM

## 2017-10-24 DIAGNOSIS — T45.1X5A CHEMOTHERAPY INDUCED NAUSEA AND VOMITING: ICD-10-CM

## 2017-10-24 DIAGNOSIS — R53.83 FATIGUE DUE TO TREATMENT: ICD-10-CM

## 2017-10-24 DIAGNOSIS — D64.81 ANEMIA ASSOCIATED WITH CHEMOTHERAPY: ICD-10-CM

## 2017-10-24 DIAGNOSIS — R11.2 CHEMOTHERAPY INDUCED NAUSEA AND VOMITING: ICD-10-CM

## 2017-10-24 DIAGNOSIS — K52.1 CHEMOTHERAPY INDUCED DIARRHEA: ICD-10-CM

## 2017-10-24 PROCEDURE — 96415 CHEMO IV INFUSION ADDL HR: CPT

## 2017-10-24 PROCEDURE — 96413 CHEMO IV INFUSION 1 HR: CPT

## 2017-10-24 PROCEDURE — 85025 COMPLETE CBC W/AUTO DIFF WBC: CPT

## 2017-10-24 PROCEDURE — 96375 TX/PRO/DX INJ NEW DRUG ADDON: CPT

## 2017-10-24 PROCEDURE — 96368 THER/DIAG CONCURRENT INF: CPT

## 2017-10-24 PROCEDURE — 80053 COMPREHEN METABOLIC PANEL: CPT

## 2017-10-24 PROCEDURE — 99215 OFFICE O/P EST HI 40 MIN: CPT | Performed by: INTERNAL MEDICINE

## 2017-10-24 PROCEDURE — 96416 CHEMO PROLONG INFUSE W/PUMP: CPT

## 2017-10-24 PROCEDURE — 96411 CHEMO IV PUSH ADDL DRUG: CPT

## 2017-10-24 RX ORDER — METOCLOPRAMIDE HYDROCHLORIDE 5 MG/ML
10 INJECTION INTRAMUSCULAR; INTRAVENOUS EVERY 6 HOURS PRN
Status: CANCELLED | OUTPATIENT
Start: 2017-10-24

## 2017-10-24 RX ORDER — LORAZEPAM 2 MG/ML
INJECTION INTRAMUSCULAR EVERY 4 HOURS PRN
Status: CANCELLED | OUTPATIENT
Start: 2017-10-24

## 2017-10-24 RX ORDER — FLUOROURACIL 50 MG/ML
2400 INJECTION, SOLUTION INTRAVENOUS CONTINUOUS
Status: CANCELLED | OUTPATIENT
Start: 2017-10-24

## 2017-10-24 RX ORDER — LORAZEPAM 0.5 MG/1
TABLET ORAL EVERY 4 HOURS PRN
Status: CANCELLED | OUTPATIENT
Start: 2017-10-24

## 2017-10-24 RX ORDER — ONDANSETRON 2 MG/ML
8 INJECTION INTRAMUSCULAR; INTRAVENOUS EVERY 6 HOURS PRN
Status: CANCELLED | OUTPATIENT
Start: 2017-10-24

## 2017-10-24 RX ORDER — PROCHLORPERAZINE MALEATE 10 MG
10 TABLET ORAL EVERY 6 HOURS PRN
Status: CANCELLED | OUTPATIENT
Start: 2017-10-24

## 2017-10-24 RX ORDER — FLUOROURACIL 50 MG/ML
2400 INJECTION, SOLUTION INTRAVENOUS CONTINUOUS
Status: DISCONTINUED | OUTPATIENT
Start: 2017-10-24 | End: 2017-10-24

## 2017-10-24 RX ADMIN — FLUOROURACIL 5000 MG: 50 INJECTION, SOLUTION INTRAVENOUS at 12:30:00

## 2017-10-24 NOTE — PROGRESS NOTES
Hematology/Oncology Clinic Follow Up Visit    Patient Name: Kike Baeza  Medical Record Number: [id number]    YOB: 1950    PCP: Dr. Major Torres ph [phone]    Reason for Consultation:  Kike Baeza was seen today for the diagnosis of started to omit 5FU boluses   -7/11/17- cycle 5 FOLFOX (delayed 1 week d/t holiday and pt preference)  -7/25/17- cycle 6 FOLFOX   -8/15/17- cycle 7 FOLFOX  -8/29/17- cycle 8 FOLFOX  -9/12/17- cycle 9 FOLFOX  -9/26/17- cycle 10 FOLFOX  -10/10/17- cycle 11 F 100 mg by mouth daily. Disp:  Rfl:    Loperamide HCl 2 MG Oral Tab Take 1 tablet (2 mg total) by mouth as needed for Diarrhea. Take 2 tablets after first loose stool and then 1 tablet after every subsequent loose stool up to 16 mg (8 tablets) daily.  Disp: cigar - quit 25 years ago    Alcohol use Yes  0.0 oz/week     Comment: occasionally- rare.   No history of excessive use    Drug use: No    Sexual activity: Not on file     Other Topics Concern   None on file     Social History Narrative     1996- h .0 (L) 09/26/2017       Lab Results  Component Value Date    (H) 10/24/2017   BUN 12 10/24/2017   CREATSERUM 0.82 10/24/2017   CREATSERUM 1.00 10/10/2017   CREATSERUM 0.94 09/26/2017   GFR 90 10/24/2017   CA 8.9 10/24/2017   ALKPHO 131 (H) premeds  -continue home zofran, compazine, and nightly olanzapine 2.5mg at bedtime    *insomnia  -improved with olanzapine    *diarrhea- related to chemotherapy  -cont imodium prn    *fatigue due to treatment  -much improved with dose reduction as above

## 2017-10-24 NOTE — PROGRESS NOTES
Patient here for MD f/u and treatment. C/O minimal fatigue and denies SOB. Occasional nausea, controlled with antiemetics. Occasional diarrhea, controlled with imodium. Denies pain.

## 2017-10-24 NOTE — PATIENT INSTRUCTIONS
For Dr. Chucky Skaggs nurse line, call [phone] with any questions or concerns,  Monday through Friday 8:00 to 4:30. After hours or weekends for urgent needs: [phone].   Central [phone]  Medical Records:   [phone]  Cancer Cent

## 2017-10-26 ENCOUNTER — NURSE ONLY (OUTPATIENT)
Dept: HEMATOLOGY/ONCOLOGY | Age: 67
End: 2017-10-26
Attending: INTERNAL MEDICINE
Payer: MEDICARE

## 2017-10-26 DIAGNOSIS — C18.9 COLON ADENOCARCINOMA (HCC): Primary | ICD-10-CM

## 2017-10-26 PROCEDURE — 96523 IRRIG DRUG DELIVERY DEVICE: CPT

## 2017-10-26 RX ORDER — SODIUM CHLORIDE 0.9 % (FLUSH) 0.9 %
10 SYRINGE (ML) INJECTION ONCE
Status: COMPLETED | OUTPATIENT
Start: 2017-10-26 | End: 2017-10-26

## 2017-10-26 RX ORDER — SODIUM CHLORIDE 9 MG/ML
1000 INJECTION, SOLUTION INTRAVENOUS ONCE
Status: CANCELLED
Start: 2017-10-26 | End: 2017-10-26

## 2017-10-26 RX ORDER — SODIUM CHLORIDE 0.9 % (FLUSH) 0.9 %
10 SYRINGE (ML) INJECTION ONCE
Status: CANCELLED | OUTPATIENT
Start: 2017-10-26

## 2017-10-26 RX ADMIN — SODIUM CHLORIDE 0.9 % (FLUSH) 10 ML: 0.9 % SYRINGE (ML) INJECTION at 11:00:00

## 2017-10-31 ENCOUNTER — SNF/IP PROF CHARGE ONLY (OUTPATIENT)
Dept: HEMATOLOGY/ONCOLOGY | Facility: HOSPITAL | Age: 67
End: 2017-10-31

## 2017-10-31 DIAGNOSIS — C18.9 COLON ADENOCARCINOMA (HCC): ICD-10-CM

## 2017-10-31 PROCEDURE — G9678 ONCOLOGY CARE MODEL SERVICE: HCPCS | Performed by: INTERNAL MEDICINE

## 2017-11-06 ENCOUNTER — HOSPITAL ENCOUNTER (OUTPATIENT)
Dept: CT IMAGING | Age: 67
Discharge: HOME | End: 2017-11-06
Attending: INTERNAL MEDICINE
Payer: MEDICARE

## 2017-11-06 DIAGNOSIS — C18.9 COLON ADENOCARCINOMA (HCC): ICD-10-CM

## 2017-11-06 PROCEDURE — 74177 CT ABD & PELVIS W/CONTRAST: CPT | Performed by: INTERNAL MEDICINE

## 2017-11-06 PROCEDURE — 71260 CT THORAX DX C+: CPT | Performed by: INTERNAL MEDICINE

## 2017-11-07 ENCOUNTER — TELEPHONE (OUTPATIENT)
Dept: HEMATOLOGY/ONCOLOGY | Facility: HOSPITAL | Age: 67
End: 2017-11-07

## 2017-11-07 NOTE — TELEPHONE ENCOUNTER
[facility] calling for clearance for deep dental cleaning. MSG to RN pool to ask MD in am. If patient done with chemo- may he have cleaning?

## 2017-11-08 ENCOUNTER — TELEPHONE (OUTPATIENT)
Dept: HEMATOLOGY/ONCOLOGY | Facility: HOSPITAL | Age: 67
End: 2017-11-08

## 2017-11-08 NOTE — TELEPHONE ENCOUNTER
Had received a call from [facility] off [address] asking if patient can have a dental cleening at this time. Patient just finished chemo. No per Dr. Isidra Styles. Patient to hold off at this time.   Called to [facility].

## 2017-11-14 ENCOUNTER — NURSE ONLY (OUTPATIENT)
Dept: HEMATOLOGY/ONCOLOGY | Age: 67
End: 2017-11-14
Attending: INTERNAL MEDICINE
Payer: MEDICARE

## 2017-11-14 ENCOUNTER — OFFICE VISIT (OUTPATIENT)
Dept: HEMATOLOGY/ONCOLOGY | Age: 67
End: 2017-11-14
Attending: INTERNAL MEDICINE
Payer: MEDICARE

## 2017-11-14 VITALS
WEIGHT: 197.19 LBS | RESPIRATION RATE: 18 BRPM | HEART RATE: 55 BPM | SYSTOLIC BLOOD PRESSURE: 150 MMHG | OXYGEN SATURATION: 97 % | DIASTOLIC BLOOD PRESSURE: 81 MMHG | BODY MASS INDEX: 26 KG/M2 | TEMPERATURE: 98 F

## 2017-11-14 DIAGNOSIS — E27.8 ADRENAL NODULE (HCC): ICD-10-CM

## 2017-11-14 DIAGNOSIS — Z15.89 MONOALLELIC MUTATION OF MUTYH GENE: ICD-10-CM

## 2017-11-14 DIAGNOSIS — D69.6 THROMBOCYTOPENIA (HCC): ICD-10-CM

## 2017-11-14 DIAGNOSIS — C18.9 COLON ADENOCARCINOMA (HCC): ICD-10-CM

## 2017-11-14 DIAGNOSIS — R59.0 PELVIC LYMPHADENOPATHY: ICD-10-CM

## 2017-11-14 DIAGNOSIS — C18.9 COLON ADENOCARCINOMA (HCC): Primary | ICD-10-CM

## 2017-11-14 DIAGNOSIS — D64.81 ANEMIA ASSOCIATED WITH CHEMOTHERAPY: ICD-10-CM

## 2017-11-14 DIAGNOSIS — T45.1X5A ANEMIA ASSOCIATED WITH CHEMOTHERAPY: ICD-10-CM

## 2017-11-14 PROCEDURE — 36591 DRAW BLOOD OFF VENOUS DEVICE: CPT

## 2017-11-14 PROCEDURE — 82378 CARCINOEMBRYONIC ANTIGEN: CPT

## 2017-11-14 PROCEDURE — 85025 COMPLETE CBC W/AUTO DIFF WBC: CPT

## 2017-11-14 PROCEDURE — 80053 COMPREHEN METABOLIC PANEL: CPT

## 2017-11-14 PROCEDURE — 99215 OFFICE O/P EST HI 40 MIN: CPT | Performed by: INTERNAL MEDICINE

## 2017-11-14 NOTE — PATIENT INSTRUCTIONS
For Dr. Guzman Smoker nurse line, call [phone] with any questions or concerns,  Monday through Friday 8:00 to 4:30. After hours or weekends for urgent needs: [phone].   Central [phone]  Medical Records:   [phone]  Cancer Cent

## 2017-11-14 NOTE — PROGRESS NOTES
Hematology/Oncology Clinic Follow Up Visit    Patient Name: Bradley Florence  Medical Record Number: [id number]    YOB: 1950    PCP: Dr. Marquita Garcia ph [phone]    Reason for Consultation:  Bradley Florence was seen today for the diagnosis of started to omit 5FU boluses   -7/11/17- cycle 5 FOLFOX (delayed 1 week d/t holiday and pt preference)  -7/25/17- cycle 6 FOLFOX   -8/15/17- cycle 7 FOLFOX  -8/29/17- cycle 8 FOLFOX  -9/12/17- cycle 9 FOLFOX  -9/26/17- cycle 10 FOLFOX  -10/10/17- cycle 11 F laparoscopic sigmoid colectomy and                resection of portion of small bowel with                asher procedure and partial cystectomy with                bladder repair and bilateral ureteral catheter                placement (Dr. Von Velasquez ASPIRIN 81 MG OR TABS 1 TABLET DAILY Disp:  Rfl:         Allergies:     Allopurinol             Hives  Zyloprim                Anaphylaxis    Psychosocial History:    Social History  Social History   Marital status: Single  Spouse name: N/A    Years of e bowel sounds, no hepatosplenomegaly.  +ostomy  Neurological: Grossly intact   Lymphatics: No palpable lymphadenopathy  Skin: no rashes or petechiae  Lines: +portacath- no erythema, induration, or drainage    Laboratory:    Lab Results  Component Value Date PET/CT scan. If PET negative would refer to Dr. Yessenia Ngo for ostomy takedown.   If concerning PET avidity is found, would work up for residual/recurrent disease.   -repeat CBC, CMP, CEA in 3 months (assuming PET is negative)  -probably should undergo surveill

## 2017-11-14 NOTE — PROGRESS NOTES
Patient here for MD f/u. States feeling well. Nausea and diarrhea after chemo but controlled with antiemetics and imodium. Minimal fatigue.

## 2017-11-21 ENCOUNTER — HOSPITAL ENCOUNTER (OUTPATIENT)
Dept: NUCLEAR MEDICINE | Facility: HOSPITAL | Age: 67
Discharge: HOME | End: 2017-11-21
Attending: INTERNAL MEDICINE
Payer: MEDICARE

## 2017-11-21 ENCOUNTER — TELEPHONE (OUTPATIENT)
Dept: HEMATOLOGY/ONCOLOGY | Facility: HOSPITAL | Age: 67
End: 2017-11-21

## 2017-11-21 DIAGNOSIS — E27.8 ADRENAL NODULE (HCC): ICD-10-CM

## 2017-11-21 DIAGNOSIS — C18.9 COLON ADENOCARCINOMA (HCC): ICD-10-CM

## 2017-11-21 DIAGNOSIS — R59.0 PELVIC LYMPHADENOPATHY: ICD-10-CM

## 2017-11-21 PROCEDURE — 78815 PET IMAGE W/CT SKULL-THIGH: CPT | Performed by: INTERNAL MEDICINE

## 2017-11-21 PROCEDURE — 82962 GLUCOSE BLOOD TEST: CPT

## 2017-11-22 ENCOUNTER — TELEPHONE (OUTPATIENT)
Dept: HEMATOLOGY/ONCOLOGY | Facility: HOSPITAL | Age: 67
End: 2017-11-22

## 2017-11-22 NOTE — TELEPHONE ENCOUNTER
PET/CT scan reviewed. No abnormal FDG uptake suspicious for recurrent disease.   Will refer to Dr. Neel Troy for colonoscopy and consideration for ostomy reversal.      Sridhar Campos MD  Hematology/Medical [address] [place] [address]

## 2017-11-22 NOTE — TELEPHONE ENCOUNTER
\"Please call pt and advise that the PET scan looked good, no evidence of any cancer seen. [facility] should make an appt with Dr. Hortencia Zaman ([phone]) for a colonoscopy and ostomy reversal. [facility] also needs to see me again in 3 months with labs, please make sure t

## 2017-12-21 ENCOUNTER — OFFICE VISIT (OUTPATIENT)
Dept: SURGERY | Facility: CLINIC | Age: 67
End: 2017-12-21

## 2017-12-21 VITALS
TEMPERATURE: 98 F | BODY MASS INDEX: 24.49 KG/M2 | SYSTOLIC BLOOD PRESSURE: 196 MMHG | HEIGHT: 75 IN | HEART RATE: 60 BPM | WEIGHT: 197 LBS | DIASTOLIC BLOOD PRESSURE: 92 MMHG

## 2017-12-21 DIAGNOSIS — Z85.038 HISTORY OF COLON CANCER, STAGE II: ICD-10-CM

## 2017-12-21 DIAGNOSIS — I25.2 HISTORY OF MYOCARDIAL INFARCTION: ICD-10-CM

## 2017-12-21 DIAGNOSIS — Z93.3 COLOSTOMY STATUS (HCC): Primary | ICD-10-CM

## 2017-12-21 DIAGNOSIS — D69.6 THROMBOCYTOPENIA (HCC): ICD-10-CM

## 2017-12-21 DIAGNOSIS — Z15.89 MONOALLELIC MUTATION OF MUTYH GENE: ICD-10-CM

## 2017-12-21 PROCEDURE — 99205 OFFICE O/P NEW HI 60 MIN: CPT | Performed by: COLON & RECTAL SURGERY

## 2017-12-21 RX ORDER — POLYETHYLENE GLYCOL 3350, SODIUM CHLORIDE, SODIUM BICARBONATE, POTASSIUM CHLORIDE 420; 11.2; 5.72; 1.48 G/4L; G/4L; G/4L; G/4L
POWDER, FOR SOLUTION ORAL
Qty: 1 BOTTLE | Refills: 0 | Status: ON HOLD | OUTPATIENT
Start: 2017-12-21 | End: 2018-01-19

## 2017-12-21 NOTE — H&P
New Patient Visit Note       Active Problems      1. Colostomy status ([facility]    2. Monoallelic mutation of MUTYH gene    3. History of colon cancer, stage II    4. [facility]    5.  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 He currently smokes an occasional cigar and occasionally drinks alcohol. Allergies  Jens Blair is allergic to allopurinol and zyloprim.     Past Medical / Surgical / Social / Family History    The past medical who live locally. Has 6 grandchildren. Lives in [facility].           Current Outpatient Prescriptions:  PEG 3350-KCl-Na Bicarb-NaCl (TRILYTE) 420 g Oral Recon Soln Starting at 4:00 pm the night before procedure, drink 8 ounces of the pre 81 MG OR TABS 1 TABLET DAILY Disp:  Rfl:          Review of Systems  The Review of Systems has been reviewed by me during today. Review of Systems   Constitutional: Negative for chills, diaphoresis, fatigue, fever and unexpected weight change.    HENT: Neg Appropriate mood and affect, oriented x3      Lab Results  Component Value Date   WBC 4.5 11/14/2017   HGB 13.4 11/14/2017   HCT 41.5 11/14/2017   .0 (L) 11/14/2017   NEPERCENT 57.1 11/14/2017   LYPERCENT 26.1 11/14/2017   MOPERCENT 11.2 11/14/2017 findings in the chest, abdomen, pelvis, as outlined on extensive report from a very recent contrast enhanced CT scan of the chest, abdomen, pelvis from November 6 #2017, please see   that very recent report for full details.     =====  CONCLUSION:  The lef patient has a history of recent myocardial infarction we will need cardiac clearance prior to colonoscopy. We will ask this of Dr. FRANKLIN [facility] his PCP and defer decision to establish a cardiologist as he is recently moved back to the [facility].   Of note the

## 2017-12-22 NOTE — PATIENT INSTRUCTIONS
Assessment   Colostomy status ([facility]  (primary encounter diagnosis)  Monoallelic mutation of MUTYH gene  History of colon cancer, stage II  [facility]  History of myocardial infarction    Plan   With the monoallelic MUTYH [place] [facility] dictation the pa January for his colonoscopy.

## 2018-01-04 ENCOUNTER — TELEPHONE (OUTPATIENT)
Dept: HEMATOLOGY/ONCOLOGY | Facility: HOSPITAL | Age: 68
End: 2018-01-04

## 2018-01-04 NOTE — TELEPHONE ENCOUNTER
Form for ostomy supplies faxed back to [facility] at V#[phone].

## 2018-01-09 ENCOUNTER — PRIOR ORIGINAL RECORDS (OUTPATIENT)
Dept: OTHER | Age: 68
End: 2018-01-09

## 2018-01-12 ENCOUNTER — TELEPHONE (OUTPATIENT)
Dept: SURGERY | Facility: CLINIC | Age: 68
End: 2018-01-12

## 2018-01-16 ENCOUNTER — LABORATORY ENCOUNTER (OUTPATIENT)
Dept: LAB | Age: 68
End: 2018-01-16
Attending: COLON & RECTAL SURGERY
Payer: MEDICARE

## 2018-01-16 DIAGNOSIS — Z85.038 HISTORY OF COLON CANCER: Primary | ICD-10-CM

## 2018-01-16 PROCEDURE — 88305 TISSUE EXAM BY PATHOLOGIST: CPT

## 2018-01-18 ENCOUNTER — TELEPHONE (OUTPATIENT)
Dept: SURGERY | Facility: CLINIC | Age: 68
End: 2018-01-18

## 2018-01-18 NOTE — TELEPHONE ENCOUNTER
Patient had colonoscopy with polypectomy x5 2 days ago and just noticed some dark blood in his ostomy bag. Denies SOB, dizziness.  Instructed pt to stop taking 81mg ASA or any aspirin containing products for next week and to call back if bleeding continues,

## 2018-01-19 ENCOUNTER — HOSPITAL ENCOUNTER (OUTPATIENT)
Facility: HOSPITAL | Age: 68
Setting detail: OBSERVATION
Discharge: HOME | End: 2018-01-20
Attending: EMERGENCY MEDICINE | Admitting: HOSPITALIST
Payer: MEDICARE

## 2018-01-19 ENCOUNTER — SURGERY (OUTPATIENT)
Age: 68
End: 2018-01-19

## 2018-01-19 DIAGNOSIS — K92.2 GASTROINTESTINAL HEMORRHAGE, UNSPECIFIED GASTROINTESTINAL HEMORRHAGE TYPE: Primary | ICD-10-CM

## 2018-01-19 LAB
ALBUMIN SERPL-MCNC: 3.6 G/DL (ref 3.5–4.8)
ALP LIVER SERPL-CCNC: 97 U/L (ref 45–117)
ALT SERPL-CCNC: 19 U/L (ref 17–63)
ANTIBODY SCREEN: NEGATIVE
AST SERPL-CCNC: 22 U/L (ref 15–41)
BASOPHILS # BLD AUTO: 0.06 X10(3) UL (ref 0–0.1)
BASOPHILS NFR BLD AUTO: 0.7 %
BILIRUB SERPL-MCNC: 0.4 MG/DL (ref 0.1–2)
BUN BLD-MCNC: 19 MG/DL (ref 8–20)
CALCIUM BLD-MCNC: 8.5 MG/DL (ref 8.3–10.3)
CHLORIDE: 110 MMOL/L (ref 101–111)
CO2: 24 MMOL/L (ref 22–32)
CREAT BLD-MCNC: 0.93 MG/DL (ref 0.7–1.3)
EOSINOPHIL # BLD AUTO: 0.22 X10(3) UL (ref 0–0.3)
EOSINOPHIL NFR BLD AUTO: 2.5 %
ERYTHROCYTE [DISTWIDTH] IN BLOOD BY AUTOMATED COUNT: 13.9 % (ref 11.5–16)
EST. AVERAGE GLUCOSE BLD GHB EST-MCNC: 114 MG/DL (ref 68–126)
GLUCOSE BLD-MCNC: 121 MG/DL (ref 65–99)
GLUCOSE BLD-MCNC: 157 MG/DL (ref 70–99)
GLUCOSE BLD-MCNC: 176 MG/DL (ref 65–99)
HBA1C MFR BLD HPLC: 5.6 % (ref ?–5.7)
HCT VFR BLD AUTO: 33.4 % (ref 37–53)
HCT VFR BLD AUTO: 35.5 % (ref 37–53)
HGB BLD-MCNC: 10.3 G/DL (ref 13–17)
HGB BLD-MCNC: 11.4 G/DL (ref 13–17)
HGB BLD-MCNC: 12.2 G/DL (ref 13–17)
IMMATURE GRANULOCYTE COUNT: 0.02 X10(3) UL (ref 0–1)
IMMATURE GRANULOCYTE RATIO %: 0.2 %
LYMPHOCYTES # BLD AUTO: 1.95 X10(3) UL (ref 0.9–4)
LYMPHOCYTES NFR BLD AUTO: 22.1 %
M PROTEIN MFR SERPL ELPH: 7.3 G/DL (ref 6.1–8.3)
MCH RBC QN AUTO: 31.4 PG (ref 27–33.2)
MCHC RBC AUTO-ENTMCNC: 34.4 G/DL (ref 31–37)
MCV RBC AUTO: 91.3 FL (ref 80–99)
MONOCYTES # BLD AUTO: 0.58 X10(3) UL (ref 0.1–0.6)
MONOCYTES NFR BLD AUTO: 6.6 %
NEUTROPHIL ABS PRELIM: 5.99 X10 (3) UL (ref 1.3–6.7)
NEUTROPHILS # BLD AUTO: 5.99 X10(3) UL (ref 1.3–6.7)
NEUTROPHILS NFR BLD AUTO: 67.9 %
PLATELET # BLD AUTO: 199 10(3)UL (ref 150–450)
POTASSIUM SERPL-SCNC: 3.8 MMOL/L (ref 3.6–5.1)
RBC # BLD AUTO: 3.89 X10(6)UL (ref 3.8–5.8)
RED CELL DISTRIBUTION WIDTH-SD: 46.5 FL (ref 35.1–46.3)
RH BLOOD TYPE: POSITIVE
SODIUM SERPL-SCNC: 142 MMOL/L (ref 136–144)
WBC # BLD AUTO: 8.8 X10(3) UL (ref 4–13)

## 2018-01-19 PROCEDURE — 0DJD8ZZ INSPECTION OF LOWER INTESTINAL TRACT, VIA NATURAL OR ARTIFICIAL OPENING ENDOSCOPIC: ICD-10-PCS | Performed by: COLON & RECTAL SURGERY

## 2018-01-19 PROCEDURE — 0W3P8ZZ CONTROL BLEEDING IN GASTROINTESTINAL TRACT, VIA NATURAL OR ARTIFICIAL OPENING ENDOSCOPIC: ICD-10-PCS | Performed by: STUDENT IN AN ORGANIZED HEALTH CARE EDUCATION/TRAINING PROGRAM

## 2018-01-19 PROCEDURE — 99213 OFFICE O/P EST LOW 20 MIN: CPT | Performed by: COLON & RECTAL SURGERY

## 2018-01-19 PROCEDURE — 99220 INITIAL OBSERVATION CARE,LEVL III: CPT | Performed by: HOSPITALIST

## 2018-01-19 PROCEDURE — 44388 COLONOSCOPY THRU STOMA SPX: CPT | Performed by: COLON & RECTAL SURGERY

## 2018-01-19 RX ORDER — MULTIVIT WITH MINERALS/LUTEIN
250 TABLET ORAL DAILY
Status: DISCONTINUED | OUTPATIENT
Start: 2018-01-19 | End: 2018-01-20

## 2018-01-19 RX ORDER — SODIUM CHLORIDE 9 MG/ML
INJECTION, SOLUTION INTRAVENOUS CONTINUOUS
Status: DISCONTINUED | OUTPATIENT
Start: 2018-01-19 | End: 2018-01-20

## 2018-01-19 RX ORDER — HYDROMORPHONE HYDROCHLORIDE 1 MG/ML
0.2 INJECTION, SOLUTION INTRAMUSCULAR; INTRAVENOUS; SUBCUTANEOUS EVERY 2 HOUR PRN
Status: DISCONTINUED | OUTPATIENT
Start: 2018-01-19 | End: 2018-01-20

## 2018-01-19 RX ORDER — ALPRAZOLAM 0.25 MG/1
0.25 TABLET ORAL 3 TIMES DAILY PRN
Status: DISCONTINUED | OUTPATIENT
Start: 2018-01-19 | End: 2018-01-20

## 2018-01-19 RX ORDER — MIDAZOLAM HYDROCHLORIDE 1 MG/ML
INJECTION INTRAMUSCULAR; INTRAVENOUS
Status: DISCONTINUED | OUTPATIENT
Start: 2018-01-19 | End: 2018-01-19 | Stop reason: HOSPADM

## 2018-01-19 RX ORDER — HYDROMORPHONE HYDROCHLORIDE 1 MG/ML
0.4 INJECTION, SOLUTION INTRAMUSCULAR; INTRAVENOUS; SUBCUTANEOUS EVERY 2 HOUR PRN
Status: DISCONTINUED | OUTPATIENT
Start: 2018-01-19 | End: 2018-01-20

## 2018-01-19 RX ORDER — OLANZAPINE 2.5 MG/1
TABLET ORAL NIGHTLY
Status: DISCONTINUED | OUTPATIENT
Start: 2018-01-19 | End: 2018-01-20

## 2018-01-19 RX ORDER — MEPERIDINE HYDROCHLORIDE 50 MG/ML
INJECTION INTRAMUSCULAR; INTRAVENOUS; SUBCUTANEOUS
Status: DISCONTINUED | OUTPATIENT
Start: 2018-01-19 | End: 2018-01-19 | Stop reason: HOSPADM

## 2018-01-19 RX ORDER — DEXTROSE MONOHYDRATE 25 G/50ML
50 INJECTION, SOLUTION INTRAVENOUS
Status: DISCONTINUED | OUTPATIENT
Start: 2018-01-19 | End: 2018-01-20

## 2018-01-19 RX ORDER — SODIUM CHLORIDE 9 MG/ML
1000 INJECTION, SOLUTION INTRAVENOUS ONCE
Status: COMPLETED | OUTPATIENT
Start: 2018-01-19 | End: 2018-01-19

## 2018-01-19 RX ORDER — METOPROLOL TARTRATE 50 MG/1
50 TABLET, FILM COATED ORAL
Status: DISCONTINUED | OUTPATIENT
Start: 2018-01-20 | End: 2018-01-20

## 2018-01-19 RX ORDER — ONDANSETRON 2 MG/ML
4 INJECTION INTRAMUSCULAR; INTRAVENOUS EVERY 6 HOURS PRN
Status: DISCONTINUED | OUTPATIENT
Start: 2018-01-19 | End: 2018-01-20

## 2018-01-19 RX ORDER — HYDROMORPHONE HYDROCHLORIDE 1 MG/ML
0.8 INJECTION, SOLUTION INTRAMUSCULAR; INTRAVENOUS; SUBCUTANEOUS EVERY 2 HOUR PRN
Status: DISCONTINUED | OUTPATIENT
Start: 2018-01-19 | End: 2018-01-20

## 2018-01-19 NOTE — ED PROVIDER NOTES
Patient Seen in: [facility] Emergency Department    History   Patient presents with:  Bleeding (hematologic)    Stated Complaint: bleeding    HPI    42-year-old male presents reporting bleeding from his colostomy.   He reports he had a colonoscopy pe for stated complaint: bleeding  Other systems are as noted in HPI. Constitutional and vital signs reviewed. All other systems reviewed and negative except as noted above.     Physical Exam   ED Triage Vitals  BP: 154/84 [01/19/18 0837]  Pulse: 91 [01/ DRAW BLUE   RAINBOW DRAW LAVENDER   RAINBOW DRAW LIGHT GREEN   RAINBOW DRAW GOLD       ED Course as of Jan 19 1033  ------------------------------------------------------------       MDM   Hemoglobin is 12.2. Patient is hemodynamically stable.   Will discu

## 2018-01-19 NOTE — OPERATIVE REPORT
[facility]  Operative Note    Juvencio Goldnlsiva Location: OR   [facility] [id number] MRN [id number]   Admission Date 1/19/2018 Operation Date 1/19/2018   Attending Physician Carol Veronica MD Operating Physician Elliott Aguirre MD     Pre-Operative Diagnosis: Lower G from the cecum. Green bile is seen to emanate from the ileocecal valve. I used a combination of external injections as well as the water pump to thoroughly clear the cecum of all blood clots and debris. All clots in the cecum were melanotic.   In the mid

## 2018-01-19 NOTE — ED NOTES
Report given to Endoscopy. Verbal order for NS 125ml/hr. Per endoscopy RN, transport to GI lab being put in now.

## 2018-01-19 NOTE — CONSULTS
[facility]  Report of Consultation    Mele Banuelos Patient Status:  Emergency    1950 MRN [id number]   Location [address] [facility] Attending Singh Castillo MD   Hosp Day # 0 PCP Carlyn Mi MD     Reason for Consultation: appropriate changes in documentation as necessary  I attest to the accuracy of this note as detailed above    Chante Morgan MD FACS FASCRS    History:  Past Medical History:   Diagnosis Date   • Colon adenocarcinoma ([facility]    • Monoallelic mutation of MU Negative for abdominal pain, negative for constipation, negative for anorexia, negative for diarrhea, positive for nausea, negative for vomiting, positive for bloody stool  Genitourinary:  Negative for dysuria and hematuria  Hema/Lymph:  Negative for easy Diabetes mellitus, type II ([facility]     Gout     Erectile dysfunction     Colon adenocarcinoma (HCC)     Iron deficiency anemia due to chronic blood loss     Chemotherapy induced nausea and vomiting     Fatigue due to treatment     Chemotherapy induced diarrh

## 2018-01-19 NOTE — OPERATIVE REPORT
Colon operative report  Patient Name: Estefani Dunn  Procedure: Colonoscopy through colostomy  Indication: hematochezia following polypectomy  Attending: Tivis Lanes MD  Assistant: Swapnil Marshall MD  Consent: The risks, benefits, and alternatives were discu degree. 5 hemostatic clips were placed around the base of the polypectomy site. The site was examined and watched further without any recurrent bleeding.    - There was a single diverticulum adjacent to the polypectomy site.   There was no blood or clot

## 2018-01-19 NOTE — H&P
[facility]  History and Physical     Justo Rodriguez Patient Status:  Observation    1950 MRN [id number]   [facility] ENDOSCOPY Attending [place] MD   Hosp Day # 0 PCP Purvi Garcia MD     Chief Complaint: GI bleed     Hi Maternal Aunt 50     type unknown       Allergies:   Allopurinol             Hives  Zyloprim                Anaphylaxis    Medications:    No current facility-administered medications on file prior to encounter.    Current Outpatient Prescriptions on File P daily. Disp:  Rfl:    HYDROcodone-acetaminophen  MG Oral Tab Take 1 tablet by mouth every 4 (four) hours as needed for Pain.  Disp:  Rfl:    ASPIRIN 81 MG OR TABS 1 TABLET DAILY Disp:  Rfl:        Review of Systems:   A comprehensive 14 point review o 4. If patient exhibits hemodynamic instability may need ICU care   2. CAD  1. Hold ASA  2. Cont BB   3. DM   1. ICF   4. HTN  1.  Hold home benzapril       Quality:  · DVT Prophylaxis: scd  · CODE status: full  · Escobar: none    Plan of care discussed with

## 2018-01-20 ENCOUNTER — PRIOR ORIGINAL RECORDS (OUTPATIENT)
Dept: OTHER | Age: 68
End: 2018-01-20

## 2018-01-20 VITALS
RESPIRATION RATE: 18 BRPM | OXYGEN SATURATION: 98 % | HEIGHT: 75.5 IN | TEMPERATURE: 99 F | HEART RATE: 67 BPM | BODY MASS INDEX: 23.81 KG/M2 | SYSTOLIC BLOOD PRESSURE: 136 MMHG | WEIGHT: 193.5 LBS | DIASTOLIC BLOOD PRESSURE: 55 MMHG

## 2018-01-20 LAB
BASOPHILS # BLD AUTO: 0.03 X10(3) UL (ref 0–0.1)
BASOPHILS NFR BLD AUTO: 0.3 %
BUN BLD-MCNC: 19 MG/DL (ref 8–20)
CALCIUM BLD-MCNC: 7.9 MG/DL (ref 8.3–10.3)
CHLORIDE: 113 MMOL/L (ref 101–111)
CO2: 25 MMOL/L (ref 22–32)
CREAT BLD-MCNC: 0.87 MG/DL (ref 0.7–1.3)
EOSINOPHIL # BLD AUTO: 0.14 X10(3) UL (ref 0–0.3)
EOSINOPHIL NFR BLD AUTO: 1.6 %
ERYTHROCYTE [DISTWIDTH] IN BLOOD BY AUTOMATED COUNT: 14.2 % (ref 11.5–16)
GLUCOSE BLD-MCNC: 111 MG/DL (ref 70–99)
GLUCOSE BLD-MCNC: 126 MG/DL (ref 65–99)
GLUCOSE BLD-MCNC: 147 MG/DL (ref 65–99)
HCT VFR BLD AUTO: 24.6 % (ref 37–53)
HGB BLD-MCNC: 8.2 G/DL (ref 13–17)
HGB BLD-MCNC: 8.9 G/DL (ref 13–17)
IMMATURE GRANULOCYTE COUNT: 0.04 X10(3) UL (ref 0–1)
IMMATURE GRANULOCYTE RATIO %: 0.5 %
LYMPHOCYTES # BLD AUTO: 2.34 X10(3) UL (ref 0.9–4)
LYMPHOCYTES NFR BLD AUTO: 27.1 %
MCH RBC QN AUTO: 30.9 PG (ref 27–33.2)
MCHC RBC AUTO-ENTMCNC: 33.3 G/DL (ref 31–37)
MCV RBC AUTO: 92.8 FL (ref 80–99)
MONOCYTES # BLD AUTO: 0.67 X10(3) UL (ref 0.1–0.6)
MONOCYTES NFR BLD AUTO: 7.8 %
NEUTROPHIL ABS PRELIM: 5.4 X10 (3) UL (ref 1.3–6.7)
NEUTROPHILS # BLD AUTO: 5.4 X10(3) UL (ref 1.3–6.7)
NEUTROPHILS NFR BLD AUTO: 62.7 %
PLATELET # BLD AUTO: 165 10(3)UL (ref 150–450)
POTASSIUM SERPL-SCNC: 3.8 MMOL/L (ref 3.6–5.1)
RBC # BLD AUTO: 2.65 X10(6)UL (ref 3.8–5.8)
RED CELL DISTRIBUTION WIDTH-SD: 48.4 FL (ref 35.1–46.3)
SODIUM SERPL-SCNC: 144 MMOL/L (ref 136–144)
WBC # BLD AUTO: 8.6 X10(3) UL (ref 4–13)

## 2018-01-20 PROCEDURE — 99217 OBSERVATION CARE DISCHARGE: CPT | Performed by: HOSPITALIST

## 2018-01-20 RX ORDER — HEPARIN SODIUM 1000 [USP'U]/ML
1000 INJECTION, SOLUTION INTRAVENOUS; SUBCUTANEOUS ONCE
Status: DISCONTINUED | OUTPATIENT
Start: 2018-01-20 | End: 2018-01-20

## 2018-01-20 NOTE — PROGRESS NOTES
Spoke to Dr Ginny Huynh. OK to RI home today. Spoke to Dr Gerhard Barajas. OK to RI home today. He will see pt prior to RI.

## 2018-01-20 NOTE — PROGRESS NOTES
Pt wanting to go home ASAP. Informed pt that he still needs to be seen by surgery to be dc'd. Pt requesting RN page MD. Page sent to Dr Lolis Escobar. Dr Jacky Ross on call. Awaiting call back.

## 2018-01-20 NOTE — PROGRESS NOTES
[facility]  Progress Note     Isa Doran Patient Status:  Inpatient    1950 MRN [id number]   [facility] 4NW-A Attending Haven Ma MD   Hosp Day # 1 PCP Akshat David MD     Chief Complaint: GI bleed     S: Patient feelin OLANZapine  2.5-5 mg Oral Nightly   • Insulin Aspart Pen  1-5 Units Subcutaneous TID CC and HS       ASSESSMENT / PLAN:     1. Post polypectecomy bleed, s/p epi and clipping. HD stable   2. CAD  1. Resume ASA when ok with GI   3. DM  4.  HTN  5. OA     Plan

## 2018-01-20 NOTE — PLAN OF CARE
NURSING ADMISSION NOTE      Patient admitted via Cart from Endoscopy. Oriented to room. Safety precautions initiated. Bed in low position. Call light in reach. Pt's Dtr Loli Small at bedside. Reviewed pt's meds and HH.  Discussed POC w/ pt and pt's dt

## 2018-01-20 NOTE — PROGRESS NOTES
01/19/18 1833   Clinical Encounter Type   Visited With Patient   Routine Visit Introduction   Continue Visiting No   Patient Spiritual Encounters   Spiritual Interventions  provided intro visit, emotional support

## 2018-01-20 NOTE — PROGRESS NOTES
[facility]  Progress Note    Jil Dunne Patient Status:  Inpatient    1950 MRN [id number]   [facility] 4NW-A Attending Elvis Wong MD   Hosp Day # 1 PCP Neida Poole MD     Subjective:  Patient states he feels much better. diarrhea     Monoallelic mutation of MUTYH gene     Anemia associated with chemotherapy     Other insomnia     Thrombocytopenia (HCC)     Pelvic lymphadenopathy     Adrenal nodule (HCC)     Gastrointestinal hemorrhage     Gastrointestinal hemorrhage, unspe

## 2018-01-22 LAB — BLOOD TYPE BARCODE: 6200

## 2018-01-24 NOTE — DISCHARGE SUMMARY
[facility] HOSPITALIST  DISCHARGE SUMMARY     Michael Rausch Patient Status:  Observation    1950 MRN [id number]   [facility] 4NW-A Attending No att. providers found   Hosp Day # 0 PCP Tresa Carolina MD     Date of Admission: 2018  Date during hospitalization:   • Colonoscopy - epinephrine injection, hemostatic clip placement     Incidental or significant findings and recommendations (brief descriptions):  • Hold asa for one week, then ok to resume     Lab/Test results pending at [facility] tablet  Commonly known as:  ZOFRAN      Take 1 tablet (8 mg total) by mouth every 8 (eight) hours as needed for Nausea.    Quantity:  30 tablet  Refills:  3     Prochlorperazine Maleate 10 mg tablet  Commonly known as:  COMPAZINE      Take 1 tablet (10 mg t

## 2018-02-07 ENCOUNTER — PRIOR ORIGINAL RECORDS (OUTPATIENT)
Dept: OTHER | Age: 68
End: 2018-02-07

## 2018-02-07 ENCOUNTER — MYAURORA ACCOUNT LINK (OUTPATIENT)
Dept: OTHER | Age: 68
End: 2018-02-07

## 2018-02-08 LAB
ALBUMIN: 3.9 G/DL
ALKALINE PHOSPHATATE(ALK PHOS): 105 IU/L
BILIRUBIN TOTAL: 0.61 MG/DL
BUN: 14 MG/DL
CALCIUM: 9 MG/DL
CHLORIDE: 105 MEQ/L
CHOLESTEROL, TOTAL: 161 MG/DL
CREATININE, SERUM: 0.85 MG/DL
GLUCOSE: 110 MG/DL
HDL CHOLESTEROL: 32 MG/DL
HEMATOCRIT: 44.2 %
HEMOGLOBIN A1C: 5.5 %
HEMOGLOBIN: 14.9 G/DL
LDL CHOLESTEROL: 88 MG/DL
PLATELETS: 115 K/UL
POTASSIUM, SERUM: 3.9 MEQ/L
PROTEIN, TOTAL: 7.1 G/DL
RED BLOOD COUNT: 4.74 X 10-6/U
SGOT (AST): 18 IU/L
SGPT (ALT): 15 IU/L
SODIUM: 138 MEQ/L
THYROID STIMULATING HORMONE: 1.1 MLU/L
TRIGLYCERIDES: 206 MG/DL
VITAMIN D 25-OH: 16.4 NG/ML
WHITE BLOOD COUNT: 5.75 X 10-3/U

## 2018-02-12 ENCOUNTER — OFFICE VISIT (OUTPATIENT)
Dept: SURGERY | Facility: CLINIC | Age: 68
End: 2018-02-12

## 2018-02-12 VITALS
HEART RATE: 57 BPM | WEIGHT: 195 LBS | TEMPERATURE: 98 F | BODY MASS INDEX: 23.75 KG/M2 | HEIGHT: 76 IN | DIASTOLIC BLOOD PRESSURE: 75 MMHG | SYSTOLIC BLOOD PRESSURE: 172 MMHG

## 2018-02-12 DIAGNOSIS — Z15.89 MONOALLELIC MUTATION OF MUTYH GENE: ICD-10-CM

## 2018-02-12 DIAGNOSIS — Z93.3 COLOSTOMY PRESENT (HCC): Primary | ICD-10-CM

## 2018-02-12 PROCEDURE — 99214 OFFICE O/P EST MOD 30 MIN: CPT | Performed by: COLON & RECTAL SURGERY

## 2018-02-12 NOTE — PROGRESS NOTES
Follow Up Visit Note       Active Problems      1. Colostomy present ([address]    2.  Monoallelic mutation of MUTYH gene          Chief Complaint   No further bleeding from colostomy      History of Present Illness  Alvin Louis is a 64-year-old gentleman who p finished in December 2017.     PET scan on 11/21/2017 was negative for abnormal FDG uptake pattern.     CEA on 11/14/2017 was 1.3.     Currently the patient states he feels the best he has in several years.   He denies any ongoing fatigue, anorexia, nausea, (BMS)  2/17/17: COLON SURGERY      Comment:  LAR, laparoscopic sigmoid colectomy and                resection of portion of small bowel with                asher procedure and partial cystectomy with                bladder repair and bilateral ureteral 2pm and 11pm Disp: 3 tablet Rfl: 0   Benazepril HCl 10 MG Oral Tab Take 10 mg by mouth daily. Disp:  Rfl:    OLANZapine 2.5 MG Oral Tab Take 1-2 tablets (2.5-5 mg total) by mouth nightly.  Disp: 60 tablet Rfl: 3   Loperamide HCl 2 MG Oral Tab Take 1 tablet of breath and wheezing. Cardiovascular: Negative for chest pain, palpitations and leg swelling. Gastrointestinal: Negative for abdominal distention, abdominal pain, anal bleeding, blood in stool, constipation, diarrhea, nausea and vomiting.    Daniela  supplements postoperatively. We will attempt a robotic assisted laparoscopic colostomy reversal with primary colorectal anastomosis.   I informed the patient that given the extent of his previous open surgery there is a higher likelihood of having to david

## 2018-02-13 LAB
BUN: 19 MG/DL
CALCIUM: 7.9 MG/DL
CHLORIDE: 113 MEQ/L
CREATININE, SERUM: 0.87 MG/DL
GLUCOSE: 111 MG/DL
HEMATOCRIT: 24.6 %
HEMOGLOBIN A1C: 5.6 %
HEMOGLOBIN: 10.3 G/DL
HEMOGLOBIN: 8.2 G/DL
HEMOGLOBIN: 8.9 G/DL
PLATELETS: 165 K/UL
POTASSIUM, SERUM: 3.8 MEQ/L
RED BLOOD COUNT: 2.65 X 10-6/U
SODIUM: 144 MEQ/L
WHITE BLOOD COUNT: 8.6 X 10-3/U

## 2018-02-13 RX ORDER — NEOMYCIN SULFATE 500 MG/1
TABLET ORAL
Qty: 6 TABLET | Refills: 0 | Status: ON HOLD | OUTPATIENT
Start: 2018-02-13 | End: 2018-04-27

## 2018-02-13 RX ORDER — POLYETHYLENE GLYCOL 3350, SODIUM CHLORIDE, SODIUM BICARBONATE, POTASSIUM CHLORIDE 420; 11.2; 5.72; 1.48 G/4L; G/4L; G/4L; G/4L
POWDER, FOR SOLUTION ORAL
Qty: 1 BOTTLE | Refills: 0 | Status: ON HOLD | OUTPATIENT
Start: 2018-02-13 | End: 2018-04-27

## 2018-02-13 RX ORDER — METRONIDAZOLE 500 MG/1
TABLET ORAL
Qty: 3 TABLET | Refills: 0 | Status: ON HOLD | OUTPATIENT
Start: 2018-02-13 | End: 2018-04-27

## 2018-02-14 NOTE — PATIENT INSTRUCTIONS
Assessment   Colostomy present ([facility] [address]  (primary encounter diagnosis)  Monoallelic mutation of MUTYH gene    Plan   I again had a long discussion with the patient and his daughter regarding reversal of his colostomy.       I informed the patient that he could

## 2018-02-20 ENCOUNTER — OFFICE VISIT (OUTPATIENT)
Dept: HEMATOLOGY/ONCOLOGY | Age: 68
End: 2018-02-20
Attending: INTERNAL MEDICINE
Payer: MEDICARE

## 2018-02-20 ENCOUNTER — NURSE ONLY (OUTPATIENT)
Dept: HEMATOLOGY/ONCOLOGY | Age: 68
End: 2018-02-20
Attending: INTERNAL MEDICINE
Payer: MEDICARE

## 2018-02-20 VITALS
DIASTOLIC BLOOD PRESSURE: 81 MMHG | OXYGEN SATURATION: 98 % | WEIGHT: 203.38 LBS | BODY MASS INDEX: 25 KG/M2 | SYSTOLIC BLOOD PRESSURE: 172 MMHG | HEART RATE: 59 BPM | RESPIRATION RATE: 16 BRPM | TEMPERATURE: 99 F

## 2018-02-20 DIAGNOSIS — D50.0 IRON DEFICIENCY ANEMIA DUE TO CHRONIC BLOOD LOSS: ICD-10-CM

## 2018-02-20 DIAGNOSIS — Z15.89 MONOALLELIC MUTATION OF MUTYH GENE: ICD-10-CM

## 2018-02-20 DIAGNOSIS — C18.9 COLON ADENOCARCINOMA (HCC): Primary | ICD-10-CM

## 2018-02-20 LAB
ALBUMIN SERPL-MCNC: 3.9 G/DL (ref 3.5–4.8)
ALP LIVER SERPL-CCNC: 113 U/L (ref 45–117)
ALT SERPL-CCNC: 18 U/L (ref 17–63)
AST SERPL-CCNC: 21 U/L (ref 15–41)
BASOPHILS # BLD AUTO: 0.04 X10(3) UL (ref 0–0.1)
BASOPHILS NFR BLD AUTO: 0.7 %
BILIRUB SERPL-MCNC: 0.4 MG/DL (ref 0.1–2)
BUN BLD-MCNC: 18 MG/DL (ref 8–20)
CALCIUM BLD-MCNC: 8.7 MG/DL (ref 8.3–10.3)
CEA: 1 NG/ML (ref 0.5–5)
CHLORIDE: 110 MMOL/L (ref 101–111)
CO2: 26 MMOL/L (ref 22–32)
CREAT BLD-MCNC: 1.17 MG/DL (ref 0.7–1.3)
DEPRECATED HBV CORE AB SER IA-ACNC: 6.9 NG/ML (ref 22–322)
EOSINOPHIL # BLD AUTO: 0.35 X10(3) UL (ref 0–0.3)
EOSINOPHIL NFR BLD AUTO: 5.9 %
ERYTHROCYTE [DISTWIDTH] IN BLOOD BY AUTOMATED COUNT: 14.7 % (ref 11.5–16)
GLUCOSE BLD-MCNC: 135 MG/DL (ref 70–99)
HCT VFR BLD AUTO: 34 % (ref 37–53)
HGB BLD-MCNC: 10.3 G/DL (ref 13–17)
IMMATURE GRANULOCYTE COUNT: 0.03 X10(3) UL (ref 0–1)
IMMATURE GRANULOCYTE RATIO %: 0.5 %
LYMPHOCYTES # BLD AUTO: 1.24 X10(3) UL (ref 0.9–4)
LYMPHOCYTES NFR BLD AUTO: 21 %
M PROTEIN MFR SERPL ELPH: 7.8 G/DL (ref 6.1–8.3)
MCH RBC QN AUTO: 26.5 PG (ref 27–33.2)
MCHC RBC AUTO-ENTMCNC: 30.3 G/DL (ref 31–37)
MCV RBC AUTO: 87.4 FL (ref 80–99)
MONOCYTES # BLD AUTO: 0.46 X10(3) UL (ref 0.1–1)
MONOCYTES NFR BLD AUTO: 7.8 %
NEUTROPHIL ABS PRELIM: 3.78 X10 (3) UL (ref 1.3–6.7)
NEUTROPHILS # BLD AUTO: 3.78 X10(3) UL (ref 1.3–6.7)
NEUTROPHILS NFR BLD AUTO: 64.1 %
PLATELET # BLD AUTO: 217 10(3)UL (ref 150–450)
POTASSIUM SERPL-SCNC: 4.1 MMOL/L (ref 3.6–5.1)
RBC # BLD AUTO: 3.89 X10(6)UL (ref 3.8–5.8)
RED CELL DISTRIBUTION WIDTH-SD: 47.1 FL (ref 35.1–46.3)
RETIC ABS CALC AUTO: 114.4 X10(3) UL (ref 22.5–147.5)
RETIC IRF CALC: 0.3 RATIO (ref 0.09–0.3)
RETIC%: 3 % (ref 0.5–2.5)
RETICULOCYTE HEMOGLOBIN EQUIVALENT: 22.7 PG (ref 28.2–36.3)
SODIUM SERPL-SCNC: 142 MMOL/L (ref 136–144)
WBC # BLD AUTO: 5.9 X10(3) UL (ref 4–13)

## 2018-02-20 PROCEDURE — 85025 COMPLETE CBC W/AUTO DIFF WBC: CPT

## 2018-02-20 PROCEDURE — 99215 OFFICE O/P EST HI 40 MIN: CPT | Performed by: INTERNAL MEDICINE

## 2018-02-20 PROCEDURE — 80053 COMPREHEN METABOLIC PANEL: CPT

## 2018-02-20 PROCEDURE — 82728 ASSAY OF FERRITIN: CPT

## 2018-02-20 PROCEDURE — 36591 DRAW BLOOD OFF VENOUS DEVICE: CPT

## 2018-02-20 PROCEDURE — 82378 CARCINOEMBRYONIC ANTIGEN: CPT

## 2018-02-20 PROCEDURE — 85045 AUTOMATED RETICULOCYTE COUNT: CPT

## 2018-02-20 RX ORDER — SODIUM CHLORIDE 0.9 % (FLUSH) 0.9 %
10 SYRINGE (ML) INJECTION ONCE
Status: COMPLETED | OUTPATIENT
Start: 2018-02-20 | End: 2018-02-20

## 2018-02-20 RX ORDER — SODIUM CHLORIDE 9 MG/ML
1000 INJECTION, SOLUTION INTRAVENOUS ONCE
Status: CANCELLED
Start: 2018-02-20 | End: 2018-02-20 | Stop reason: HOSPADM

## 2018-02-20 RX ORDER — SODIUM CHLORIDE 0.9 % (FLUSH) 0.9 %
10 SYRINGE (ML) INJECTION ONCE
Status: CANCELLED | OUTPATIENT
Start: 2018-02-20

## 2018-02-20 RX ADMIN — SODIUM CHLORIDE 0.9 % (FLUSH) 10 ML: 0.9 % SYRINGE (ML) INJECTION at 10:46:00

## 2018-02-20 NOTE — PROGRESS NOTES
Hematology/Oncology Clinic Follow Up Visit    Patient Name: Alok Barillas  Medical Record Number: [id number]    YOB: 1950    PCP: Dr. Paula Chavez [phone]  Other providers: Dr. Jorge Giordano    Reason for Consultation:  Alok medina FOLFOX (delayed 1 week d/t fatigue), started to omit 5FU boluses   -7/11/17- cycle 5 FOLFOX (delayed 1 week d/t holiday and pt preference)  -7/25/17- cycle 6 FOLFOX   -8/15/17- cycle 7 FOLFOX  -8/29/17- cycle 8 FOLFOX  -9/12/17- cycle 9 FOLFOX  -9/26/17- c History:  Past Medical History:   Diagnosis Date   • Back problem    • Colon adenocarcinoma ([facility] [address]    • Deep vein thrombosis ([facility]    • Diabetes ([facility]    • Heart attack ([facility]    • High blood pressure    • History of blood transfusion    • Monoallelic mutati times daily. Disp:  Rfl:    Glucose Blood (FREESTYLE LITE TEST) In Vitro Strip  Disp:  Rfl:    Diclofenac Sodium 1 % Transdermal Gel Apply topically 4 (four) times daily.  Disp:  Rfl:    Multiple Vitamin (MULTI VITAMIN MENS) Oral Tab Take 1 tablet by mout oz)  12/21/17 : 89.4 kg (197 lb)  11/14/17 : 89.4 kg (197 lb 3.2 oz)  10/24/17 : 86.2 kg (190 lb)    ECOG PS: 1-2    Physical Examination:  General: Patient is alert and oriented, not in acute distress  Psych: Mood and affect are appropriate  Eyes: EOMI  E reviewed above    Impression & Plan:     *Colon adenocarcinoma  -stage II (T4bN0), given perforation with invasion of adjacent small bowel and bladder, he is at a high risk for recurrence and therefore was treated with adjuvant chemotherapy with FOLFOX x 1

## 2018-02-20 NOTE — PATIENT INSTRUCTIONS
For Dr. Chucky Skaggs nurse line, call [phone] with any questions or concerns,  Monday through Friday 8:00 to 4:30. After hours or weekends for urgent needs: [phone].   Central [phone]  Medical Records:   [phone]  Cancer Cent

## 2018-02-20 NOTE — PROGRESS NOTES
Patient here for MD f/u. States feeling well currently. Diarrhea under control. Had colonoscopy in January. Going to have colostomy reversal he thinks, sometime in March. Denies bleeding currently.

## 2018-02-21 ENCOUNTER — TELEPHONE (OUTPATIENT)
Dept: HEMATOLOGY/ONCOLOGY | Facility: HOSPITAL | Age: 68
End: 2018-02-21

## 2018-02-21 PROBLEM — R59.0 PELVIC LYMPHADENOPATHY: Status: RESOLVED | Noted: 2017-11-14 | Resolved: 2018-02-21

## 2018-02-21 NOTE — TELEPHONE ENCOUNTER
\"Please call pt and advise that his iron level is very low and we should replace asap so his blood count can come up prior to surgery.  Please arrange for him to come back for a 1.5hr IV iron infusion (received INFeD in the past already). \"    LM on cell

## 2018-02-27 ENCOUNTER — TELEPHONE (OUTPATIENT)
Dept: SURGERY | Facility: CLINIC | Age: 68
End: 2018-02-27

## 2018-02-27 ENCOUNTER — OFFICE VISIT (OUTPATIENT)
Dept: HEMATOLOGY/ONCOLOGY | Age: 68
End: 2018-02-27
Attending: INTERNAL MEDICINE
Payer: MEDICARE

## 2018-02-27 VITALS
SYSTOLIC BLOOD PRESSURE: 184 MMHG | RESPIRATION RATE: 16 BRPM | HEART RATE: 57 BPM | TEMPERATURE: 99 F | DIASTOLIC BLOOD PRESSURE: 83 MMHG | OXYGEN SATURATION: 96 %

## 2018-02-27 DIAGNOSIS — Z85.038 HISTORY OF COLON CANCER: Primary | ICD-10-CM

## 2018-02-27 DIAGNOSIS — T45.1X5A ANEMIA ASSOCIATED WITH CHEMOTHERAPY: ICD-10-CM

## 2018-02-27 DIAGNOSIS — D50.0 IRON DEFICIENCY ANEMIA DUE TO CHRONIC BLOOD LOSS: ICD-10-CM

## 2018-02-27 DIAGNOSIS — K92.2 GASTROINTESTINAL HEMORRHAGE, UNSPECIFIED GASTROINTESTINAL HEMORRHAGE TYPE: Primary | ICD-10-CM

## 2018-02-27 DIAGNOSIS — D64.81 ANEMIA ASSOCIATED WITH CHEMOTHERAPY: ICD-10-CM

## 2018-02-27 DIAGNOSIS — C18.9 COLON ADENOCARCINOMA (HCC): ICD-10-CM

## 2018-02-27 PROCEDURE — 96365 THER/PROPH/DIAG IV INF INIT: CPT

## 2018-02-27 RX ORDER — SODIUM CHLORIDE 0.9 % (FLUSH) 0.9 %
10 SYRINGE (ML) INJECTION ONCE
Status: COMPLETED | OUTPATIENT
Start: 2018-02-27 | End: 2018-02-27

## 2018-02-27 RX ORDER — SODIUM CHLORIDE 0.9 % (FLUSH) 0.9 %
10 SYRINGE (ML) INJECTION ONCE
Status: CANCELLED | OUTPATIENT
Start: 2018-02-27

## 2018-02-27 RX ORDER — SODIUM CHLORIDE 9 MG/ML
1000 INJECTION, SOLUTION INTRAVENOUS ONCE
Status: CANCELLED
Start: 2018-02-27 | End: 2018-02-27

## 2018-02-27 NOTE — PROGRESS NOTES
Education Record  Learner:  Patient  Disease / Diagnosis:   YON  Barriers / Limitations:  None  Method:  Printed material and Reinforcement  General Topics:  Plan of care reviewed; reviewed potential side effects - he has had InFed prior.   His surgery will

## 2018-03-14 ENCOUNTER — MED REC SCAN ONLY (OUTPATIENT)
Dept: HEMATOLOGY/ONCOLOGY | Facility: HOSPITAL | Age: 68
End: 2018-03-14

## 2018-03-21 RX ORDER — SODIUM CHLORIDE, SODIUM LACTATE, POTASSIUM CHLORIDE, CALCIUM CHLORIDE 600; 310; 30; 20 MG/100ML; MG/100ML; MG/100ML; MG/100ML
INJECTION, SOLUTION INTRAVENOUS CONTINUOUS
Status: CANCELLED | OUTPATIENT
Start: 2018-03-21

## 2018-04-04 ENCOUNTER — PRIOR ORIGINAL RECORDS (OUTPATIENT)
Dept: OTHER | Age: 68
End: 2018-04-04

## 2018-04-26 ENCOUNTER — ANESTHESIA EVENT (OUTPATIENT)
Dept: SURGERY | Facility: HOSPITAL | Age: 68
DRG: 330 | End: 2018-04-26
Payer: MEDICARE

## 2018-04-27 ENCOUNTER — ANESTHESIA (OUTPATIENT)
Dept: SURGERY | Facility: HOSPITAL | Age: 68
DRG: 330 | End: 2018-04-27
Payer: MEDICARE

## 2018-04-27 ENCOUNTER — SURGERY (OUTPATIENT)
Age: 68
End: 2018-04-27

## 2018-04-27 ENCOUNTER — HOSPITAL ENCOUNTER (INPATIENT)
Facility: HOSPITAL | Age: 68
LOS: 5 days | Discharge: HOME HEALTH | DRG: 330 | End: 2018-05-02
Attending: COLON & RECTAL SURGERY | Admitting: COLON & RECTAL SURGERY
Payer: MEDICARE

## 2018-04-27 DIAGNOSIS — Z93.3 COLOSTOMY STATUS (HCC): ICD-10-CM

## 2018-04-27 PROBLEM — K27.9 PUD (PEPTIC ULCER DISEASE): Chronic | Status: ACTIVE | Noted: 2018-04-27

## 2018-04-27 PROBLEM — I10 ESSENTIAL HYPERTENSION: Chronic | Status: ACTIVE | Noted: 2018-04-27

## 2018-04-27 PROBLEM — I25.10 CORONARY ARTERY DISEASE INVOLVING NATIVE CORONARY ARTERY OF NATIVE HEART WITHOUT ANGINA PECTORIS: Chronic | Status: ACTIVE | Noted: 2018-04-27

## 2018-04-27 PROCEDURE — 8E0W4CZ ROBOTIC ASSISTED PROCEDURE OF TRUNK REGION, PERCUTANEOUS ENDOSCOPIC APPROACH: ICD-10-PCS | Performed by: COLON & RECTAL SURGERY

## 2018-04-27 PROCEDURE — 0DJD8ZZ INSPECTION OF LOWER INTESTINAL TRACT, VIA NATURAL OR ARTIFICIAL OPENING ENDOSCOPIC: ICD-10-PCS | Performed by: COLON & RECTAL SURGERY

## 2018-04-27 PROCEDURE — 0T788DZ DILATION OF BILATERAL URETERS WITH INTRALUMINAL DEVICE, VIA NATURAL OR ARTIFICIAL OPENING ENDOSCOPIC: ICD-10-PCS | Performed by: UROLOGY

## 2018-04-27 PROCEDURE — 0DBE0ZZ EXCISION OF LARGE INTESTINE, OPEN APPROACH: ICD-10-PCS | Performed by: COLON & RECTAL SURGERY

## 2018-04-27 PROCEDURE — 99233 SBSQ HOSP IP/OBS HIGH 50: CPT | Performed by: HOSPITALIST

## 2018-04-27 RX ORDER — FAMOTIDINE 10 MG/ML
20 INJECTION, SOLUTION INTRAVENOUS 2 TIMES DAILY
Status: DISCONTINUED | OUTPATIENT
Start: 2018-04-27 | End: 2018-05-02

## 2018-04-27 RX ORDER — ACETAMINOPHEN 500 MG
1000 TABLET ORAL ONCE
Status: COMPLETED | OUTPATIENT
Start: 2018-04-27 | End: 2018-04-27

## 2018-04-27 RX ORDER — TRAMADOL HYDROCHLORIDE 50 MG/1
50 TABLET ORAL EVERY 6 HOURS
Status: DISCONTINUED | OUTPATIENT
Start: 2018-04-27 | End: 2018-04-28

## 2018-04-27 RX ORDER — ACETAMINOPHEN 500 MG
1000 TABLET ORAL ONCE
Status: DISCONTINUED | OUTPATIENT
Start: 2018-04-27 | End: 2018-04-27 | Stop reason: HOSPADM

## 2018-04-27 RX ORDER — DEXTROSE MONOHYDRATE 25 G/50ML
50 INJECTION, SOLUTION INTRAVENOUS
Status: DISCONTINUED | OUTPATIENT
Start: 2018-04-27 | End: 2018-04-27 | Stop reason: HOSPADM

## 2018-04-27 RX ORDER — HYDROMORPHONE HYDROCHLORIDE 1 MG/ML
0.4 INJECTION, SOLUTION INTRAMUSCULAR; INTRAVENOUS; SUBCUTANEOUS EVERY 2 HOUR PRN
Status: DISCONTINUED | OUTPATIENT
Start: 2018-04-27 | End: 2018-05-02

## 2018-04-27 RX ORDER — DULOXETIN HYDROCHLORIDE 60 MG/1
60 CAPSULE, DELAYED RELEASE ORAL DAILY
COMMUNITY

## 2018-04-27 RX ORDER — BUPIVACAINE HYDROCHLORIDE AND EPINEPHRINE 5; 5 MG/ML; UG/ML
INJECTION, SOLUTION EPIDURAL; INTRACAUDAL; PERINEURAL AS NEEDED
Status: DISCONTINUED | OUTPATIENT
Start: 2018-04-27 | End: 2018-04-27

## 2018-04-27 RX ORDER — LABETALOL HYDROCHLORIDE 5 MG/ML
5 INJECTION, SOLUTION INTRAVENOUS EVERY 5 MIN PRN
Status: DISCONTINUED | OUTPATIENT
Start: 2018-04-27 | End: 2018-04-27 | Stop reason: HOSPADM

## 2018-04-27 RX ORDER — METHOCARBAMOL 500 MG/1
500 TABLET, FILM COATED ORAL 2 TIMES DAILY
Status: DISCONTINUED | OUTPATIENT
Start: 2018-04-27 | End: 2018-04-28

## 2018-04-27 RX ORDER — CELECOXIB 100 MG/1
100 CAPSULE ORAL DAILY
Status: ON HOLD | COMMUNITY
End: 2018-05-02

## 2018-04-27 RX ORDER — ACETAMINOPHEN 500 MG
1000 TABLET ORAL
Status: DISCONTINUED | OUTPATIENT
Start: 2018-04-27 | End: 2018-05-02

## 2018-04-27 RX ORDER — FAMOTIDINE 20 MG/1
20 TABLET ORAL 2 TIMES DAILY
Status: DISCONTINUED | OUTPATIENT
Start: 2018-04-27 | End: 2018-05-02

## 2018-04-27 RX ORDER — HYDROMORPHONE HYDROCHLORIDE 1 MG/ML
0.2 INJECTION, SOLUTION INTRAMUSCULAR; INTRAVENOUS; SUBCUTANEOUS EVERY 2 HOUR PRN
Status: DISCONTINUED | OUTPATIENT
Start: 2018-04-27 | End: 2018-05-02

## 2018-04-27 RX ORDER — HYDROMORPHONE HYDROCHLORIDE 1 MG/ML
0.8 INJECTION, SOLUTION INTRAMUSCULAR; INTRAVENOUS; SUBCUTANEOUS EVERY 2 HOUR PRN
Status: DISCONTINUED | OUTPATIENT
Start: 2018-04-27 | End: 2018-05-02

## 2018-04-27 RX ORDER — SODIUM CHLORIDE 9 MG/ML
INJECTION, SOLUTION INTRAVENOUS CONTINUOUS
Status: DISCONTINUED | OUTPATIENT
Start: 2018-04-27 | End: 2018-04-27 | Stop reason: HOSPADM

## 2018-04-27 RX ORDER — DOCUSATE SODIUM 100 MG/1
100 CAPSULE, LIQUID FILLED ORAL 2 TIMES DAILY
Status: DISCONTINUED | OUTPATIENT
Start: 2018-04-27 | End: 2018-04-28

## 2018-04-27 RX ORDER — HEPARIN SODIUM 5000 [USP'U]/ML
5000 INJECTION, SOLUTION INTRAVENOUS; SUBCUTANEOUS ONCE
Status: COMPLETED | OUTPATIENT
Start: 2018-04-27 | End: 2018-04-27

## 2018-04-27 RX ORDER — NAPROXEN 500 MG/1
500 TABLET ORAL 2 TIMES DAILY WITH MEALS
Status: DISCONTINUED | OUTPATIENT
Start: 2018-04-27 | End: 2018-05-02

## 2018-04-27 RX ORDER — MAGNESIUM OXIDE 400 MG (241.3 MG MAGNESIUM) TABLET
400 TABLET DAILY
Status: DISCONTINUED | OUTPATIENT
Start: 2018-04-27 | End: 2018-05-02

## 2018-04-27 RX ORDER — ASPIRIN 81 MG/1
81 TABLET ORAL DAILY
COMMUNITY

## 2018-04-27 RX ORDER — ACETAMINOPHEN 500 MG
1000 TABLET ORAL ONCE
COMMUNITY
End: 2019-04-08

## 2018-04-27 RX ORDER — GABAPENTIN 100 MG/1
200 CAPSULE ORAL ONCE
Status: COMPLETED | OUTPATIENT
Start: 2018-04-27 | End: 2018-04-27

## 2018-04-27 RX ORDER — HEPARIN SODIUM 5000 [USP'U]/ML
5000 INJECTION, SOLUTION INTRAVENOUS; SUBCUTANEOUS EVERY 8 HOURS SCHEDULED
Status: DISCONTINUED | OUTPATIENT
Start: 2018-04-28 | End: 2018-04-30

## 2018-04-27 RX ORDER — ONDANSETRON 2 MG/ML
4 INJECTION INTRAMUSCULAR; INTRAVENOUS EVERY 4 HOURS PRN
Status: DISCONTINUED | OUTPATIENT
Start: 2018-04-27 | End: 2018-05-02

## 2018-04-27 RX ORDER — DEXTROSE MONOHYDRATE 25 G/50ML
50 INJECTION, SOLUTION INTRAVENOUS
Status: DISCONTINUED | OUTPATIENT
Start: 2018-04-27 | End: 2018-05-02

## 2018-04-27 RX ORDER — HYDROMORPHONE HYDROCHLORIDE 1 MG/ML
0.4 INJECTION, SOLUTION INTRAMUSCULAR; INTRAVENOUS; SUBCUTANEOUS EVERY 5 MIN PRN
Status: DISCONTINUED | OUTPATIENT
Start: 2018-04-27 | End: 2018-04-27 | Stop reason: HOSPADM

## 2018-04-27 RX ORDER — GABAPENTIN 300 MG/1
300 CAPSULE ORAL 3 TIMES DAILY
Status: DISCONTINUED | OUTPATIENT
Start: 2018-04-27 | End: 2018-04-30

## 2018-04-27 RX ORDER — LABETALOL HYDROCHLORIDE 5 MG/ML
INJECTION, SOLUTION INTRAVENOUS
Status: COMPLETED
Start: 2018-04-27 | End: 2018-04-27

## 2018-04-27 RX ORDER — SODIUM CHLORIDE, SODIUM LACTATE, POTASSIUM CHLORIDE, CALCIUM CHLORIDE 600; 310; 30; 20 MG/100ML; MG/100ML; MG/100ML; MG/100ML
INJECTION, SOLUTION INTRAVENOUS CONTINUOUS
Status: DISCONTINUED | OUTPATIENT
Start: 2018-04-27 | End: 2018-04-28

## 2018-04-27 RX ORDER — SODIUM CHLORIDE, SODIUM LACTATE, POTASSIUM CHLORIDE, CALCIUM CHLORIDE 600; 310; 30; 20 MG/100ML; MG/100ML; MG/100ML; MG/100ML
2 INJECTION, SOLUTION INTRAVENOUS CONTINUOUS
Status: DISCONTINUED | OUTPATIENT
Start: 2018-04-27 | End: 2018-04-27

## 2018-04-27 RX ORDER — NALOXONE HYDROCHLORIDE 0.4 MG/ML
80 INJECTION, SOLUTION INTRAMUSCULAR; INTRAVENOUS; SUBCUTANEOUS AS NEEDED
Status: DISCONTINUED | OUTPATIENT
Start: 2018-04-27 | End: 2018-04-27 | Stop reason: HOSPADM

## 2018-04-27 RX ORDER — SODIUM CHLORIDE 9 MG/ML
INJECTION, SOLUTION INTRAVENOUS CONTINUOUS
Status: DISCONTINUED | OUTPATIENT
Start: 2018-04-27 | End: 2018-04-27

## 2018-04-27 RX ORDER — HYDRALAZINE HYDROCHLORIDE 20 MG/ML
10 INJECTION INTRAMUSCULAR; INTRAVENOUS EVERY 6 HOURS PRN
Status: DISCONTINUED | OUTPATIENT
Start: 2018-04-27 | End: 2018-04-29

## 2018-04-27 RX ORDER — POLYETHYLENE GLYCOL 3350 17 G/17G
17 POWDER, FOR SOLUTION ORAL DAILY PRN
Status: DISCONTINUED | OUTPATIENT
Start: 2018-04-27 | End: 2018-04-28

## 2018-04-27 RX ORDER — METRONIDAZOLE 500 MG/100ML
500 INJECTION, SOLUTION INTRAVENOUS EVERY 8 HOURS
Status: DISCONTINUED | OUTPATIENT
Start: 2018-04-27 | End: 2018-04-30

## 2018-04-27 RX ORDER — ONDANSETRON 2 MG/ML
4 INJECTION INTRAMUSCULAR; INTRAVENOUS AS NEEDED
Status: DISCONTINUED | OUTPATIENT
Start: 2018-04-27 | End: 2018-04-27 | Stop reason: HOSPADM

## 2018-04-27 RX ORDER — METRONIDAZOLE 500 MG/100ML
500 INJECTION, SOLUTION INTRAVENOUS ONCE
Status: COMPLETED | OUTPATIENT
Start: 2018-04-27 | End: 2018-04-27

## 2018-04-27 NOTE — H&P
History and Physical    Active Problems      1. Colostomy present ([facility] [address]    2.  Monoallelic mutation of MUTYH gene          Chief Complaint   No further bleeding from colostomy        History of Present Illness  Aubree Luna is a 69yo gentleman here today fo without lymphovascular or perineural invasion.  Genetic testing revealed monoallelic MUTYH mutation.     He underwent 12 cycles of FOLFOX which he finished in December 2017.     PET scan on 11/21/2017 was negative for abnormal FDG uptake pattern.     CEA o antineoplastic chemotherapy     • PUD (peptic ulcer disease)       s/p UGIB in his 25s   • Visual impairment        Past Surgical History:  No date: CATH BARE METAL STENT (BMS)  2/17/17: COLON SURGERY      Comment:  LAR, laparoscopic sigmoid colectomy and 1 Bottle Rfl: 0   Neomycin Sulfate 500 MG Oral Tab Take 2 tablets by mouth at 1 pm, 2 pm, and 11 pm Disp: 6 tablet Rfl: 0   metRONIDAZOLE (FLAGYL) 500 MG Oral Tab Take 1 tablet at 1pm, 2pm and 11pm Disp: 3 tablet Rfl: 0   Benazepril HCl 10 MG Oral Tab Take diaphoresis, fatigue, fever and unexpected weight change. HENT: Negative for hearing loss, nosebleeds, sore throat and trouble swallowing. Respiratory: Negative for apnea, cough, shortness of breath and wheezing.     Cardiovascular: Negative for chest able to mitigate this with antidiarrheals and fiber supplements postoperatively.     We will attempt a robotic assisted laparoscopic colostomy reversal with primary colorectal anastomosis.   I informed the patient that given the extent of his previous open

## 2018-04-27 NOTE — ANESTHESIA POSTPROCEDURE EVALUATION
[address] Patient Status:  Surgery Admit   Age/Gender 69year old male MRN [id number]   [facility] SURGERY Attending Sarah Lala MD   Hosp Day # 0 PCP Becca Carrasquillo MD       Anesthesia Post-op Note    Procedur

## 2018-04-27 NOTE — OPERATIVE REPORT
[facility]  Operative Note    Alok Barillas Location: OR   CSN [id number] MRN [id number]    1950 Age 69year old   Admission Date 2018 Operation Date 2018   Attending Physician Radha Bird MD Operating Physician Huey Anne cystoscopy and ureteral catheter placement. Please see his operative note for details on this portion of the case. After adequate padding and monitoring devices were placed, the abdomen was prepped and draped in usual sterile fashion.  Pre-operative antibi the splenic flexure. We then briefly turned our attention to the colostomy itself. The attachments between the colon and its mesentery with the surrounding parastomal hernia sac were divided. Several omental adhesions were divided as well.     The pa is identified. The Kocher clamp is placed. The colon is divided and the specimen is sent from the operative field to pathology for further evaluation. The sizers are used to determine the stapler size. The #29 stapler is chosen as the appropriate size.  A p transversus abdominis place under laparoscopic visualization. Pneumoperitoneum is released and all remaining trocars are removed. Diagnostic survey of the abdomen has confirmed no iatrogenic injury visible.  The wound protector is removed and the peritoneum

## 2018-04-27 NOTE — PROGRESS NOTES
[facility] Pharmacy Note: Antimicrobial Weight Dose Adjustment for: ceftriaxone (Caralyn Nolan)    Michael Rausch is a 69year old male who has been prescribed ceftriaxone (ROCEPHIN) 2g q24hr    Weight/BMI of 95.3 kg/25.6 kg/m².      Based on this criteria and indication

## 2018-04-27 NOTE — ANESTHESIA PREPROCEDURE EVALUATION
PRE-OP EVALUATION    Patient Name: Tye Laguerre    Pre-op Diagnosis: Colostomy status ([facility] [Z93.3]    Procedure(s):  CYSTOSCOPY, BILATERAL URETERAL STENT PLACEMENT (DR. RUEDA) ROBOTIC LAPAROSCOPIC COLOSTOMY REVERSAL, POSSIBLE OPEN, POSSIBLE DIVERTING OSTO PEG 3350-KCl-Na Bicarb-NaCl (TRILYTE) 420 g Oral Recon Soln Starting at 4:00 pm the night before procedure, drink 8 ounces of the prep every 15-20 minutes until finished Disp: 1 Bottle Rfl: 0   Neomycin Sulfate 500 MG Oral Tab Take 2 tablets by mouth at 1 Neuro/Psych  Comment: [facility] head [address] spasms/neuropathy.   neuropathy                                  Past Surgical History:  No date: CATH BARE METAL STENT (BMS)  2/17/17: COLON SURGERY      Comment:  LAR, laparoscopic sigmoid colectom ASA: 2   Plan: general  NPO status verified and patient meets guidelines. Patient has taken beta blockers in last 24 hours.       Comment: Options, risks and benefits of anesthesia as outlined in the anesthesia consent were reviewed with the patient

## 2018-04-28 PROBLEM — Z93.3 COLOSTOMY STATUS (HCC): Status: ACTIVE | Noted: 2018-04-28

## 2018-04-28 PROCEDURE — 99232 SBSQ HOSP IP/OBS MODERATE 35: CPT | Performed by: HOSPITALIST

## 2018-04-28 RX ORDER — TRAMADOL HYDROCHLORIDE 50 MG/1
50 TABLET ORAL EVERY 6 HOURS PRN
Status: DISCONTINUED | OUTPATIENT
Start: 2018-04-28 | End: 2018-05-02

## 2018-04-28 RX ORDER — SODIUM CHLORIDE, SODIUM LACTATE, POTASSIUM CHLORIDE, CALCIUM CHLORIDE 600; 310; 30; 20 MG/100ML; MG/100ML; MG/100ML; MG/100ML
INJECTION, SOLUTION INTRAVENOUS CONTINUOUS
Status: DISCONTINUED | OUTPATIENT
Start: 2018-04-28 | End: 2018-05-02

## 2018-04-28 RX ORDER — METHOCARBAMOL 500 MG/1
500 TABLET, FILM COATED ORAL 3 TIMES DAILY
Status: DISCONTINUED | OUTPATIENT
Start: 2018-04-28 | End: 2018-05-02

## 2018-04-28 NOTE — CONSULTS
[facility] HOSPITALIST  CONSULT     Bernell [place] Patient Status:  Observation    1950 MRN [id number]   [facility] 3NW-A Attending Eli Camejo MD   Hosp Day # 0 PCP Christine Renteria MD     Reason for consult: Medical management resection of portion of small bowel with                asher procedure and partial cystectomy with                bladder repair and bilateral ureteral catheter                placement (Dr. Mena Oakley, [place], [place])  No date: COLONOSCOPY tablet after every subsequent loose stool up to 16 mg (8 tablets) daily. Disp: 30 tablet Rfl: 0   Ondansetron HCl (ZOFRAN) 8 MG tablet Take 1 tablet (8 mg total) by mouth every 8 (eight) hours as needed for Nausea.  Disp: 30 tablet Rfl: 3   Prochlorperazine for input(s): TROP, CK in the last 72 hours. Imaging: Imaging data reviewed in Epic. ASSESSMENT / PLAN:     1. S/P colostomy reversal-patient currently n.p.o.  General surgery managing. 2. Hyperglycemia-patient prediabetic not on any medications.

## 2018-04-28 NOTE — PLAN OF CARE
Patient resting in bed. Escobar draining bloody/brown urine. No clots noted. Irrigated with 100 cc's normal saline. No clots noted. Able to flush easily with clear pink tinged urine noted.  RN discussed with patient if feeling of urgency or pelvic pressure, l

## 2018-04-28 NOTE — PLAN OF CARE
GASTROINTESTINAL - ADULT    • Minimal or absence of nausea and vomiting Progressing    • Maintains or returns to baseline bowel function Progressing        HEMATOLOGIC - ADULT    • Maintains hematologic stability Progressing        PAIN - ADULT    • Verbal

## 2018-04-28 NOTE — PROGRESS NOTES
NURSING ADMISSION NOTE      Patient admitted via Cart  Oriented to room. Safety precautions initiated. Bed in low position. Call light in reach. RECEIVED PATIENT FROM RECOVERY ROOM . ALERT AND ORIENT X 4 , ON O2 3L/NC , C POX , O2 SAT 96% .  NO RESP

## 2018-04-28 NOTE — PROGRESS NOTES
[facility]  Progress Note    Zoë Palumbo Patient Status:  Inpatient    1950 MRN [id number]   [facility] 3NW-A Attending Khadra Shay MD   Hosp Day # 1 PCP Janice Valle MD     Subjective:  Complains of mild soreness in unspecified gastrointestinal hemorrhage type     PUD (peptic ulcer disease)     Essential hypertension     Coronary artery disease involving native coronary artery of native heart without angina pectoris     Colostomy status ([facility]      69year-old gentlema

## 2018-04-28 NOTE — PROGRESS NOTES
[facility]PITAL[facility]  Progress Note     [facility]:  [facility]    1950 MRN [id number]   [facility] 3NW-A Attending Shama Metcalf MD   Hosp Day # 1 PCP Kendall Bueno MD     Chief Complaint: Medical Management metRONIDAZOLE  500 mg Intravenous Q8H   • Insulin Aspart Pen  1-10 Units Subcutaneous 4 times per day       ASSESSMENT / PLAN:     1. HTN:  PRN Hydralazine  2. CAD  3. PUD  4.  S/P Colostomy reversal    Plan of care: Await return of bowel function    Qualit

## 2018-04-29 PROCEDURE — 99232 SBSQ HOSP IP/OBS MODERATE 35: CPT | Performed by: HOSPITALIST

## 2018-04-29 RX ORDER — LISINOPRIL 10 MG/1
10 TABLET ORAL DAILY
Status: DISCONTINUED | OUTPATIENT
Start: 2018-04-29 | End: 2018-05-01

## 2018-04-29 RX ORDER — HYDRALAZINE HYDROCHLORIDE 20 MG/ML
10 INJECTION INTRAMUSCULAR; INTRAVENOUS
Status: DISCONTINUED | OUTPATIENT
Start: 2018-04-29 | End: 2018-05-02

## 2018-04-29 RX ORDER — METOPROLOL TARTRATE 50 MG/1
50 TABLET, FILM COATED ORAL
Status: DISCONTINUED | OUTPATIENT
Start: 2018-04-29 | End: 2018-05-02

## 2018-04-29 NOTE — PLAN OF CARE
GASTROINTESTINAL - ADULT    • Minimal or absence of nausea and vomiting Not Progressing        Nausea this morning; relief with Zofran. No additional nausea noted this afternoon.  Patient \"feels better sitting up\"    GASTROINTESTINAL - ADULT    • Maintain

## 2018-04-29 NOTE — PROGRESS NOTES
[facility]  Progress Note    Melodie Loaiza Patient Status:  Inpatient    1950 MRN [id number]   [facility] 3NW-A Attending Carmen Kessler MD   Hosp Day # 2 PCP Johnson Cope MD     Subjective:  Patient tolerated some ice chi 04/29/2018       Assessment/Plan:  Patient Active Problem List:     Diabetes mellitus, type II (Radha Gipson)     Gout     Erectile dysfunction     Colon adenocarcinoma (HCC)     Iron deficiency anemia due to chronic blood loss     Chemotherapy induced nausea and v

## 2018-04-29 NOTE — PROGRESS NOTES
[facility]PITAL[facility]  Progress Note     Derek Lim Patient Status:  Inpatient    1950 MRN [id number]   [facility] 3NW-A Attending Catie Mi MD   Hosp Day # 2 PCP Jo Ann Rodriguez MD     Chief Complaint: Medical Management • cefTRIAXone  1 g Intravenous Q24H   • metRONIDAZOLE  500 mg Intravenous Q8H   • Insulin Aspart Pen  1-10 Units Subcutaneous 4 times per day       ASSESSMENT / PLAN:     1. HTN:  Adjust Hydralazine, resume ACE. 2. CAD  3. PUD  4.  S/P Colostomy reversal

## 2018-04-30 PROCEDURE — 99232 SBSQ HOSP IP/OBS MODERATE 35: CPT | Performed by: HOSPITALIST

## 2018-04-30 RX ORDER — AMLODIPINE BESYLATE 5 MG/1
5 TABLET ORAL DAILY
Status: DISCONTINUED | OUTPATIENT
Start: 2018-04-30 | End: 2018-05-02

## 2018-04-30 NOTE — PROGRESS NOTES
[facility]PITAL[facility]  Progress Note     Cleopatra Marie Patient Status:  Inpatient    1950 MRN [id number]   [facility] 3NW-A Attending Mary Golden MD   Hosp Day # 3 PCP Jose Luis Briscoe MD     Chief Complaint: Medical Management 5,000 Units Subcutaneous [facility]   • acetaminophen  1,000 mg Oral TID   • magnesium oxide  400 mg Oral Daily   • naproxen  500 mg Oral BID with meals   • famoTIDine  20 mg Oral BID    Or   • famoTIDine  20 mg Intravenous BID   • gabapentin  300 mg Oral TID

## 2018-04-30 NOTE — PROGRESS NOTES
[facility]  Progress Note    Nicole Casarez Patient Status:  Inpatient    1950 MRN [id number]   [facility] 3NW-A Attending Adia Gonzalez MD   Hosp Day # 3 PCP Emily Russell MD     Subjective:  Patient feels better today.   Yina Ruff gene     Anemia associated with chemotherapy     Other insomnia     Thrombocytopenia (HCC)     Adrenal nodule (HCC)     Gastrointestinal hemorrhage     Gastrointestinal hemorrhage, unspecified gastrointestinal hemorrhage type     PUD (peptic ulcer disease)

## 2018-05-01 PROCEDURE — 99232 SBSQ HOSP IP/OBS MODERATE 35: CPT | Performed by: HOSPITALIST

## 2018-05-01 RX ORDER — LISINOPRIL 20 MG/1
20 TABLET ORAL 2 TIMES DAILY
Status: DISCONTINUED | OUTPATIENT
Start: 2018-05-01 | End: 2018-05-02

## 2018-05-01 RX ORDER — AMLODIPINE BESYLATE 5 MG/1
5 TABLET ORAL DAILY
Status: COMPLETED | OUTPATIENT
Start: 2018-05-01 | End: 2018-05-01

## 2018-05-01 RX ORDER — POTASSIUM CHLORIDE 20 MEQ/1
40 TABLET, EXTENDED RELEASE ORAL EVERY 4 HOURS
Status: COMPLETED | OUTPATIENT
Start: 2018-05-01 | End: 2018-05-01

## 2018-05-01 NOTE — PHYSICAL THERAPY NOTE
PHYSICAL THERAPY EVALUATION - INPATIENT     Room Number: 062/995-G  Evaluation Date: 5/1/2018  Type of Evaluation: Initial  Physician Order: PT Eval and Treat    Presenting Problem: Colon CA c colostomy, now s/p robot assisted lap c colostomy seymour Surgical History  Past Surgical History:  No date: CATH BARE METAL STENT (BMS)  2/17/17: COLON SURGERY      Comment:  LAR, laparoscopic sigmoid colectomy and                resection of portion of small bowel with                asher procedure and part precautions    RANGE OF MOTION AND STRENGTH ASSESSMENT  Upper extremity ROM and strength are-see OT eval    Lower extremity ROM is within functional limits     Lower extremity strength is : BLE hip flex 4-/5, quad 4/5, ankle 4/5    BALANCE  Static Sitting: bed as he wanted to sleep. Pt sat on EOB c CGA and returned to supine c supervision. Pt provided c pillow to return to R SL and place btw his legs. RN made aware of session.      Exercise/Education Provided:  Bed mobility  Body mechanics  Functional activit training  Rehab Potential : Good  Frequency (Obs): 3x/week  Number of Visits to Meet Established Goals: 2;Other (Comment) (1-2 visits)      CURRENT GOALS    Goal #1 Patient is able to demonstrate supine - sit EOB @ level: modified independent     Goal #2 P

## 2018-05-01 NOTE — PROGRESS NOTES
[facility]  Progress Note    Ray Parker Patient Status:  Inpatient    1950 MRN [id number]   [facility] 3NW-A Attending Isaias Blank MD   Hosp Day # 4 PCP Mirian Monreal MD     Subjective:  Patient up ambulating in the h dysfunction     Colon adenocarcinoma (HCC)     Iron deficiency anemia due to chronic blood loss     Chemotherapy induced nausea and vomiting     Fatigue due to treatment     Chemotherapy induced diarrhea     Monoallelic mutation of MUTYH gene     Anemia as

## 2018-05-01 NOTE — PLAN OF CARE
Problem: GASTROINTESTINAL - ADULT  Goal: Minimal or absence of nausea and vomiting  INTERVENTIONS:  - Maintain adequate hydration with IV or PO as ordered and tolerated  - Evaluate effectiveness of ordered antiemetic medications  - Provide nonpharmacologic of pain and evaluate response  - Implement non-pharmacological measures as appropriate and evaluate response  - Consider cultural and social influences on pain and pain management  - Manage/alleviate anxiety  - Utilize distraction and/or relaxation techniq verbalizes understanding. Will continue to monitor.

## 2018-05-01 NOTE — PROGRESS NOTES
[facility]  Progress Note     Parisa Vallecillo Patient Status:  Inpatient    1950 MRN [id number]   [facility] 3NW-A Attending Gypsy Smith MD   Hosp Day # 4 PCP Nura Holland MD     Chief Complaint: Medical Management • methocarbamol  500 mg Oral TID   • acetaminophen  1,000 mg Oral TID   • magnesium oxide  400 mg Oral Daily   • naproxen  500 mg Oral BID with meals   • famoTIDine  20 mg Oral BID    Or   • famoTIDine  20 mg Intravenous BID       ASSESSMENT / PLAN:

## 2018-05-01 NOTE — CM/SW NOTE
Met with the pt at the bedside. He lives at [address] [place] [address]. He agrees that he would benefit from [address] RN due to his being post-op and dc'd to [place] with a surgical drain.  He has used [employer] P: [phone] f: [phone]   in the

## 2018-05-01 NOTE — CM/SW NOTE
Sarthak Shepherd has accepted the pt for [facility]. Shad Villareal RN, [facility]    [phone] pgr: [phone]

## 2018-05-01 NOTE — OCCUPATIONAL THERAPY NOTE
OCCUPATIONAL THERAPY QUICK EVALUATION - INPATIENT    Room Number: 080/927-P  Evaluation Date: 5/1/2018     Type of Evaluation: Initial and Quick Eval  Presenting Problem: colostomy reversal    Physician Order: IP Consult to Occupational Therapy  Reason for (BMS)  2/17/17: COLON SURGERY      Comment:  LAR, laparoscopic sigmoid colectomy and                resection of portion of small bowel with                asher procedure and partial cystectomy with                bladder repair and bilateral ureteral clothing?: None   -   Bathing (including washing, rinsing, drying)?: None  -   Toileting, which includes using toilet, bedpan or urinal? : None  -   Putting on and taking off regular upper body clothing?: None  -   Taking care of personal grooming such as - 3 performance deficits    Client Assessment/Performance Deficits  LOW - No comorbidities nor modifications of tasks    Clinical Decision Making  LOW - Analysis of occupational profile, problem-focused assessments, limited treatment options    Overall Com

## 2018-05-02 VITALS
SYSTOLIC BLOOD PRESSURE: 160 MMHG | BODY MASS INDEX: 25.57 KG/M2 | DIASTOLIC BLOOD PRESSURE: 80 MMHG | WEIGHT: 210 LBS | OXYGEN SATURATION: 98 % | HEIGHT: 76 IN | HEART RATE: 72 BPM | RESPIRATION RATE: 18 BRPM | TEMPERATURE: 98 F

## 2018-05-02 PROCEDURE — 99232 SBSQ HOSP IP/OBS MODERATE 35: CPT | Performed by: HOSPITALIST

## 2018-05-02 RX ORDER — LABETALOL 200 MG/1
100 TABLET, FILM COATED ORAL EVERY 12 HOURS SCHEDULED
Status: DISCONTINUED | OUTPATIENT
Start: 2018-05-02 | End: 2018-05-02

## 2018-05-02 RX ORDER — BENAZEPRIL HYDROCHLORIDE 10 MG/1
40 TABLET ORAL DAILY
Qty: 120 TABLET | Refills: 0 | Status: SHIPPED | OUTPATIENT
Start: 2018-05-02 | End: 2019-04-08

## 2018-05-02 RX ORDER — AMLODIPINE BESYLATE 5 MG/1
5 TABLET ORAL ONCE
Status: COMPLETED | OUTPATIENT
Start: 2018-05-02 | End: 2018-05-02

## 2018-05-02 RX ORDER — LABETALOL 100 MG/1
100 TABLET, FILM COATED ORAL 3 TIMES DAILY
Qty: 90 TABLET | Refills: 0 | Status: SHIPPED | OUTPATIENT
Start: 2018-05-02 | End: 2018-06-01

## 2018-05-02 RX ORDER — AMLODIPINE BESYLATE 5 MG/1
10 TABLET ORAL DAILY
Status: DISCONTINUED | OUTPATIENT
Start: 2018-05-02 | End: 2018-05-02

## 2018-05-02 RX ORDER — TRAMADOL HYDROCHLORIDE 50 MG/1
50 TABLET ORAL EVERY 6 HOURS PRN
Qty: 20 TABLET | Refills: 0 | Status: SHIPPED | OUTPATIENT
Start: 2018-05-02 | End: 2018-05-29

## 2018-05-02 RX ORDER — AMLODIPINE BESYLATE 10 MG/1
10 TABLET ORAL DAILY
Qty: 30 TABLET | Refills: 0 | Status: SHIPPED | OUTPATIENT
Start: 2018-05-03 | End: 2018-06-02

## 2018-05-02 NOTE — PROGRESS NOTES
[facility]  Progress Note     Melodie Loaiza Patient Status:  Inpatient    1950 MRN [id number]   [facility] 3NW-A Attending Carmen Kessler MD   Hosp Day # 5 PCP Johnson Cope MD     Chief Complaint: Medical Management Oral Daily   • naproxen  500 mg Oral BID with meals   • famoTIDine  20 mg Oral BID    Or   • famoTIDine  20 mg Intravenous BID       ASSESSMENT / PLAN:     1. HTN:  Change metoprolol to labetalol. Ok to DC if improved after first dose. 2. CAD  3. PUD  4.

## 2018-05-02 NOTE — PLAN OF CARE
/81 HR 90 after recheck from apresoline given for /69 HR 78. Call placed to Dr Vicente Banda regarding above with one time norvasc 5 mg po given as pt did receive 5 mg today. Pt informed of new order. Will continue to monitor BP.

## 2018-05-02 NOTE — PROGRESS NOTES
Patient discharged to home. Discharge instructions reviewed with the patient and his daughter and they both verbalized their understanding of the discharge instructions.  The patient was given blood pressure medication prescriptions and a prescription for T

## 2018-05-02 NOTE — PROGRESS NOTES
[facility]  Progress Note    Gabriel Victor Patient Status:  Inpatient    1950 MRN [id number]   [facility] 3NW-A Attending Gaila Severs, MD   Hosp Day # 5 PCP Gianni Dowling MD     Subjective:  No new complaints.  Minimal abd Essential hypertension     Coronary artery disease involving native coronary artery of native heart without angina pectoris     Colostomy status ([facility]      POD 5 colostomy takedown  Expected Ileus has resolved    Plan:  Home today with drain.   Drain care i

## 2018-05-04 NOTE — DISCHARGE SUMMARY
[facility]  Discharge Summary    Dominique Scheuermann Patient Status:  Inpatient    1950 MRN [id number]   [facility] 3NW-A Attending No att. providers found   Hosp Day # 5 PCP Joseamnuel Dupree MD     Date of Admission: 2018    Date of D to 8.2.  Since that time he is states he has much more energy and feels overall much better.  He has not experienced any further bleeding from his colostomy. Manfred Fountain is tolerating diet and continuing to exercise frequently.     On his colonoscopy he did have s stent placement.  C5-C6 neck injury suffered in [facility] resulting in mild bilateral upper extremity numbness.  Osteoarthritis.  History of peptic ulcer disease.     In addition to his colon surgery he has had right knee surgery.     Medications include landon total) by mouth 3 (three) times daily. , Print Script, Disp-90 tablet, R-0      CONTINUE these medications which have CHANGED    Benazepril HCl 10 MG Oral Tab  Take 4 tablets (40 mg total) by mouth daily. , Print Script, Disp-120 tablet, R-0      CONTINUE th Devi next week. Other Discharge Instructions:  1. Home today with drain. 2. Drain care instructions  3. Do not lift more than 10 pounds. 4. Do not drive a car or return to work until your follow up appointment. 5. You may shower, no baths.   [address] [place] [address]

## 2018-05-04 NOTE — OPERATIVE REPORT
[facility]    Patients Name: Mele Banuelos  Attending Physician: Valeri arechiga. providers found  CSN: [id number]    Location:  312/312-A  MRN: [id number]    YOB: 1950  Admission Date: 4/27/2018     Operative Note    Patient Name: Mel Fuller

## 2018-05-08 ENCOUNTER — OFFICE VISIT (OUTPATIENT)
Dept: SURGERY | Facility: CLINIC | Age: 68
End: 2018-05-08

## 2018-05-08 VITALS
TEMPERATURE: 98 F | WEIGHT: 205 LBS | DIASTOLIC BLOOD PRESSURE: 70 MMHG | HEART RATE: 70 BPM | HEIGHT: 76 IN | BODY MASS INDEX: 24.96 KG/M2 | SYSTOLIC BLOOD PRESSURE: 128 MMHG

## 2018-05-08 DIAGNOSIS — Z09 FOLLOW-UP EXAMINATION: Primary | ICD-10-CM

## 2018-05-08 PROCEDURE — 99024 POSTOP FOLLOW-UP VISIT: CPT | Performed by: COLON & RECTAL SURGERY

## 2018-05-08 NOTE — PROGRESS NOTES
Post Operative Visit Note       Active Problems  1. Follow-up examination         Chief Complaint   Pain at drain site.        History of Present Illness   Darion Vargas is a 69year old male is a 52-year-old gentleman who underwent robotic assisted laparosc History:  No date: CATH BARE METAL STENT (BMS)  2/17/17: COLON SURGERY      Comment:  LAR, laparoscopic sigmoid colectomy and                resection of portion of small bowel with                asher procedure and partial cystectomy with tablet (10 mg total) by mouth daily. Disp: 30 tablet Rfl: 0   Labetalol HCl 100 MG Oral Tab Take 1 tablet (100 mg total) by mouth 3 (three) times daily. Disp: 90 tablet Rfl: 0   acetaminophen 500 MG Oral Tab Take 1,000 mg by mouth one time.  Disp:  Rfl: for apnea, cough, shortness of breath and wheezing. Cardiovascular: Negative for chest pain, palpitations and leg swelling. Gastrointestinal: Positive for abdominal pain.  Negative for abdominal distention, anal bleeding, blood in stool, constipation, Neurological: He is alert and oriented to person, place, and time. Skin: Skin is warm and dry. No rash noted. He is not diaphoretic. Psychiatric: He has a normal mood and affect.  His behavior is normal.           Assessment   Follow-up examination  (

## 2018-05-09 ENCOUNTER — TELEPHONE (OUTPATIENT)
Dept: HEMATOLOGY/ONCOLOGY | Facility: HOSPITAL | Age: 68
End: 2018-05-09

## 2018-05-17 ENCOUNTER — TELEPHONE (OUTPATIENT)
Dept: SURGERY | Facility: CLINIC | Age: 68
End: 2018-05-17

## 2018-05-17 NOTE — TELEPHONE ENCOUNTER
sydnee syed calling regarding patient  States pt was on Celebrex before surgery however was d/c'd due to sx. Patient is requesting to go back on medication. Stated he called general MD and was okay by them however is needing surgeons okay.     Patent is

## 2018-05-29 ENCOUNTER — OFFICE VISIT (OUTPATIENT)
Dept: SURGERY | Facility: CLINIC | Age: 68
End: 2018-05-29

## 2018-05-29 VITALS
SYSTOLIC BLOOD PRESSURE: 145 MMHG | WEIGHT: 205.81 LBS | HEIGHT: 76 IN | DIASTOLIC BLOOD PRESSURE: 72 MMHG | BODY MASS INDEX: 25.06 KG/M2 | TEMPERATURE: 98 F | HEART RATE: 73 BPM

## 2018-05-29 DIAGNOSIS — Z09 FOLLOW-UP EXAMINATION: Primary | ICD-10-CM

## 2018-05-29 PROCEDURE — 99024 POSTOP FOLLOW-UP VISIT: CPT | Performed by: COLON & RECTAL SURGERY

## 2018-05-29 NOTE — PROGRESS NOTES
Follow Up Visit Note       Active Problems      1.  Follow-up examination          Chief Complaint   Mild left lower quadrant pain, constipation      History of Present Illness  Jason Calvert is a 69year old male who underwent robotic assisted laparoscopic impairment     glasses     Past Surgical History:  No date: CATH BARE METAL STENT (BMS)  2/17/17: COLON SURGERY      Comment:  LAR, laparoscopic sigmoid colectomy and                resection of portion of small bowel with                asher procedure Oral Tab Take 1 tablet (100 mg total) by mouth 3 (three) times daily. Disp: 90 tablet Rfl: 0   acetaminophen 500 MG Oral Tab Take 1,000 mg by mouth one time. Disp:  Rfl:    DULoxetine HCl 60 MG Oral Cap DR Particles Take 60 mg by mouth daily.  Disp:  Rfl: chest pain, palpitations and leg swelling. Gastrointestinal: Positive for constipation. Negative for abdominal distention, abdominal pain, anal bleeding, blood in stool, diarrhea, nausea and vomiting.    Genitourinary: Negative for difficulty urinating, d diagnosis)    Plan   Overall the patient is doing very well 1 month out from surgery. He should continue to increase the amount of fiber in his diet. I suggest a once daily fiber supplement.   Patient can continue to use Colace as needed for constipatio

## 2018-06-28 ENCOUNTER — OFFICE VISIT (OUTPATIENT)
Dept: SURGERY | Facility: CLINIC | Age: 68
End: 2018-06-28

## 2018-06-28 VITALS
WEIGHT: 210 LBS | BODY MASS INDEX: 25.57 KG/M2 | HEIGHT: 76 IN | DIASTOLIC BLOOD PRESSURE: 77 MMHG | TEMPERATURE: 98 F | SYSTOLIC BLOOD PRESSURE: 145 MMHG | HEART RATE: 76 BPM

## 2018-06-28 DIAGNOSIS — Z09 FOLLOW-UP EXAMINATION: Primary | ICD-10-CM

## 2018-06-28 DIAGNOSIS — Z85.038 HISTORY OF COLON CANCER: ICD-10-CM

## 2018-06-28 DIAGNOSIS — Z15.89 MONOALLELIC MUTATION OF MUTYH GENE: ICD-10-CM

## 2018-06-28 PROCEDURE — 99024 POSTOP FOLLOW-UP VISIT: CPT | Performed by: COLON & RECTAL SURGERY

## 2018-06-28 RX ORDER — DOCUSATE SODIUM 100 MG/1
100 CAPSULE, LIQUID FILLED ORAL 2 TIMES DAILY
COMMUNITY
End: 2019-05-08

## 2018-06-28 NOTE — PROGRESS NOTES
Follow Up Visit Note       Active Problems      1. Follow-up examination    2. History of colon cancer    3.  Monoallelic mutation of MUTYH gene          Chief Complaint   No specific complaints      History of Present Illness  Cholo Soria is a 69year old (BMS)  2/17/17: COLON SURGERY      Comment:  LAR, laparoscopic sigmoid colectomy and                resection of portion of small bowel with                asher procedure and partial cystectomy with                bladder repair and bilateral ureteral units Oral Tab Take by mouth daily. Disp:  Rfl:    aspirin 81 MG Oral Tab EC Take 81 mg by mouth daily. Disp:  Rfl:    OLANZapine 2.5 MG Oral Tab Take 1-2 tablets (2.5-5 mg total) by mouth nightly.  Disp: 60 tablet Rfl: 3   Loperamide HCl 2 MG Oral Tab Take Genitourinary: Negative for difficulty urinating, dysuria, frequency and urgency. Musculoskeletal: Negative for arthralgias and myalgias. Skin: Negative for color change and rash. Neurological: Negative for tremors, syncope and weakness.    Hematolo

## 2018-06-29 NOTE — PATIENT INSTRUCTIONS
Assessment   Follow-up examination  (primary encounter diagnosis)  History of colon cancer  Monoallelic mutation of MUTYH gene    Plan   Currently the patient is doing very well 2 months after surgery. Continue high-fiber diet.   Recommend patient conver

## 2018-08-16 ENCOUNTER — OFFICE VISIT (OUTPATIENT)
Dept: SURGERY | Facility: CLINIC | Age: 68
End: 2018-08-16
Payer: MEDICARE

## 2018-08-16 VITALS
DIASTOLIC BLOOD PRESSURE: 78 MMHG | WEIGHT: 210 LBS | SYSTOLIC BLOOD PRESSURE: 133 MMHG | HEART RATE: 98 BPM | BODY MASS INDEX: 25.57 KG/M2 | HEIGHT: 76 IN | TEMPERATURE: 98 F

## 2018-08-16 DIAGNOSIS — Z09 FOLLOW-UP EXAMINATION: Primary | ICD-10-CM

## 2018-08-16 DIAGNOSIS — Z15.89 MONOALLELIC MUTATION OF MUTYH GENE: ICD-10-CM

## 2018-08-16 DIAGNOSIS — Z85.038 HISTORY OF COLON CANCER, STAGE II: ICD-10-CM

## 2018-08-16 PROCEDURE — 99213 OFFICE O/P EST LOW 20 MIN: CPT | Performed by: COLON & RECTAL SURGERY

## 2018-08-16 RX ORDER — PENICILLIN V POTASSIUM 500 MG/1
1 TABLET ORAL 4 TIMES DAILY
Refills: 0 | COMMUNITY
Start: 2018-08-11 | End: 2019-05-08

## 2018-08-16 NOTE — PROGRESS NOTES
Post Operative Visit Note       Active Problems  1. Follow-up examination    2. History of colon cancer, stage II    3.  Monoallelic mutation of MUTYH gene         Chief Complaint   Pain from cracked tooth       History of Present Illness   Bradley Florence is • Visual impairment     glasses     Past Surgical History:  No date: CATH BARE METAL STENT (BMS)  2/17/17: COLON SURGERY      Comment:  LAR, laparoscopic sigmoid colectomy and                resection of portion of small bowel with                block DR Particles Take 60 mg by mouth daily. Disp:  Rfl:    Cholecalciferol (VITAMIN D) 1000 units Oral Tab Take by mouth daily. Disp:  Rfl:    aspirin 81 MG Oral Tab EC Take 81 mg by mouth daily.  Disp:  Rfl:    OLANZapine 2.5 MG Oral Tab Take 1-2 tablets (2.5- disturbance. Respiratory: Negative for apnea, cough, shortness of breath and wheezing. Cardiovascular: Negative for chest pain, palpitations and leg swelling.    Gastrointestinal: Negative for abdominal distention, abdominal pain, anal bleeding, blood mood and affect.  His behavior is normal.           Assessment   Follow-up examination  (primary encounter diagnosis)  History of colon cancer, stage II  Monoallelic mutation of MUTYH gene      Plan   Patient is doing well 4 months after colostomy reversal.

## 2018-08-16 NOTE — PATIENT INSTRUCTIONS
Assessment   Follow-up examination  (primary encounter diagnosis)  History of colon cancer, stage II  Monoallelic mutation of MUTYH gene      Plan   Patient is doing well 4 months after colostomy reversal.  He is tolerating a diet and his bowels are func

## 2018-11-19 ENCOUNTER — OFFICE VISIT (OUTPATIENT)
Dept: SURGERY | Facility: CLINIC | Age: 68
End: 2018-11-19
Payer: MEDICARE

## 2018-11-19 VITALS
SYSTOLIC BLOOD PRESSURE: 156 MMHG | HEART RATE: 61 BPM | TEMPERATURE: 98 F | BODY MASS INDEX: 26.18 KG/M2 | HEIGHT: 76 IN | WEIGHT: 215 LBS | DIASTOLIC BLOOD PRESSURE: 85 MMHG

## 2018-11-19 DIAGNOSIS — Z15.89 MONOALLELIC MUTATION OF MUTYH GENE: ICD-10-CM

## 2018-11-19 DIAGNOSIS — Z85.038 HISTORY OF COLON CANCER, STAGE II: Primary | ICD-10-CM

## 2018-11-19 PROCEDURE — 99213 OFFICE O/P EST LOW 20 MIN: CPT | Performed by: COLON & RECTAL SURGERY

## 2018-11-19 NOTE — PROGRESS NOTES
Follow Up Visit Note       Active Problems      1. History of colon cancer, stage II    2.  Monoallelic mutation of MUTYH gene          Chief Complaint   No specific complaints      History of Present Illness  Derek Lim is a 77year old male who underwen Procedure Laterality Date   • CATH BARE METAL STENT (BMS)     • COLON SURGERY  2/17/17     LAR, laparoscopic sigmoid colectomy and resection of portion of small bowel with asher procedure and partial cystectomy with bladder repair and bilateral ureter post dental procedure  Disp:  Rfl: 0   docusate sodium 100 MG Oral Cap Take 100 mg by mouth 2 (two) times daily. Disp:  Rfl:    Benazepril HCl 10 MG Oral Tab Take 4 tablets (40 mg total) by mouth daily.  Disp: 120 tablet Rfl: 0   acetaminophen 500 MG Oral T sore throat and trouble swallowing. Eyes: Negative for visual disturbance. Respiratory: Negative for apnea, cough, shortness of breath and wheezing. Cardiovascular: Negative for chest pain, palpitations and leg swelling.    Gastrointestinal: Negativ mood and affect.  His behavior is normal.            Assessment   History of colon cancer, stage II  (primary encounter diagnosis)  Monoallelic mutation of MUTYH gene    Plan   Patient is doing well 7 months status post colostomy reversal.  He is tolerating

## 2018-12-02 NOTE — PATIENT INSTRUCTIONS
Assessment   History of colon cancer, stage II  (primary encounter diagnosis)  Monoallelic mutation of MUTYH gene    Plan   Patient is doing well 7 months status post colostomy reversal.  He is tolerating diet and having normal bowel movements.     Patient

## 2019-02-26 ENCOUNTER — OFFICE VISIT (OUTPATIENT)
Dept: SURGERY | Facility: CLINIC | Age: 69
End: 2019-02-26
Payer: MEDICARE

## 2019-02-26 VITALS
HEART RATE: 73 BPM | TEMPERATURE: 98 F | HEIGHT: 76 IN | SYSTOLIC BLOOD PRESSURE: 166 MMHG | DIASTOLIC BLOOD PRESSURE: 78 MMHG | WEIGHT: 215 LBS | BODY MASS INDEX: 26.18 KG/M2

## 2019-02-26 DIAGNOSIS — I10 ESSENTIAL HYPERTENSION: Chronic | ICD-10-CM

## 2019-02-26 DIAGNOSIS — E11.9 TYPE 2 DIABETES MELLITUS WITHOUT COMPLICATION, WITHOUT LONG-TERM CURRENT USE OF INSULIN (HCC): ICD-10-CM

## 2019-02-26 DIAGNOSIS — I25.10 CORONARY ARTERY DISEASE INVOLVING NATIVE CORONARY ARTERY OF NATIVE HEART WITHOUT ANGINA PECTORIS: Chronic | ICD-10-CM

## 2019-02-26 DIAGNOSIS — Z85.038 HISTORY OF COLON CANCER, STAGE II: Primary | ICD-10-CM

## 2019-02-26 DIAGNOSIS — K27.9 PUD (PEPTIC ULCER DISEASE): Chronic | ICD-10-CM

## 2019-02-26 DIAGNOSIS — Z15.89 MONOALLELIC MUTATION OF MUTYH GENE: ICD-10-CM

## 2019-02-26 PROCEDURE — 99213 OFFICE O/P EST LOW 20 MIN: CPT | Performed by: COLON & RECTAL SURGERY

## 2019-02-26 NOTE — PATIENT INSTRUCTIONS
Assessment   History of colon cancer, stage II  (primary encounter diagnosis)  Monoallelic mutation of MUTYH gene  PUD (peptic ulcer disease)  Type 2 diabetes mellitus without complication, without long-term current use of insulin ([facility] [address]  Essential hyperte

## 2019-02-26 NOTE — PROGRESS NOTES
Follow Up Visit Note       Active Problems      1. History of colon cancer, stage II    2. Monoallelic mutation of MUTYH gene    3. PUD (peptic ulcer disease)    4.  Type 2 diabetes mellitus without complication, without long-term current use of insulin (HC glasses     Past Surgical History:   Procedure Laterality Date   • CATH BARE METAL STENT (BMS)     • COLON SURGERY  2/17/17     LAR, laparoscopic sigmoid colectomy and resection of portion of small bowel with asher procedure and partial cystectomy with Take by mouth daily. aspirin 81 MG Oral Tab EC Take 81 mg by mouth daily. OLANZapine 2.5 MG Oral Tab Take 1-2 tablets (2.5-5 mg total) by mouth nightly. Loperamide HCl 2 MG Oral Tab Take 1 tablet (2 mg total) by mouth as needed for Diarrhea.  Take 2 t headaches. Hematological: Does not bruise/bleed easily. Psychiatric/Behavioral: Negative for agitation and suicidal ideas. The patient is not nervous/anxious.          Physical Findings   BP (!) 166/78   Pulse 73   Temp 98.3 °F (36.8 °C) (Oral)   Ht 76\ cardiac clearance prior to colonoscopy.     We will use the Suprep bowel prep, as he has tolerated the large volume TriLyte/GoLYTELY prep with difficulty in the past.    The benefits of colonoscopy, including detection of cancer and polyp removal prior to p

## 2019-02-27 ENCOUNTER — PRIOR ORIGINAL RECORDS (OUTPATIENT)
Dept: OTHER | Age: 69
End: 2019-02-27

## 2019-02-28 VITALS
WEIGHT: 200 LBS | HEIGHT: 74 IN | BODY MASS INDEX: 25.67 KG/M2 | SYSTOLIC BLOOD PRESSURE: 128 MMHG | DIASTOLIC BLOOD PRESSURE: 64 MMHG | HEART RATE: 56 BPM

## 2019-03-11 ENCOUNTER — TELEPHONE (OUTPATIENT)
Dept: SURGERY | Facility: CLINIC | Age: 69
End: 2019-03-11

## 2019-03-11 DIAGNOSIS — Z12.11 SPECIAL SCREENING FOR MALIGNANT NEOPLASMS, COLON: Primary | ICD-10-CM

## 2019-04-18 ENCOUNTER — ANESTHESIA EVENT (OUTPATIENT)
Dept: ENDOSCOPY | Facility: HOSPITAL | Age: 69
End: 2019-04-18

## 2019-04-19 ENCOUNTER — ANESTHESIA (OUTPATIENT)
Dept: ENDOSCOPY | Facility: HOSPITAL | Age: 69
End: 2019-04-19

## 2019-04-19 ENCOUNTER — HOSPITAL ENCOUNTER (OUTPATIENT)
Facility: HOSPITAL | Age: 69
Setting detail: HOSPITAL OUTPATIENT SURGERY
Discharge: HOME | End: 2019-04-19
Attending: COLON & RECTAL SURGERY | Admitting: COLON & RECTAL SURGERY
Payer: MEDICARE

## 2019-04-19 VITALS
BODY MASS INDEX: 27.28 KG/M2 | HEART RATE: 71 BPM | RESPIRATION RATE: 18 BRPM | TEMPERATURE: 99 F | HEIGHT: 76 IN | WEIGHT: 224 LBS | SYSTOLIC BLOOD PRESSURE: 131 MMHG | DIASTOLIC BLOOD PRESSURE: 67 MMHG | OXYGEN SATURATION: 96 %

## 2019-04-19 DIAGNOSIS — Z12.11 COLON CANCER SCREENING: ICD-10-CM

## 2019-04-19 PROCEDURE — 0DBM8ZX EXCISION OF DESCENDING COLON, VIA NATURAL OR ARTIFICIAL OPENING ENDOSCOPIC, DIAGNOSTIC: ICD-10-PCS | Performed by: COLON & RECTAL SURGERY

## 2019-04-19 PROCEDURE — 0DBK8ZX EXCISION OF ASCENDING COLON, VIA NATURAL OR ARTIFICIAL OPENING ENDOSCOPIC, DIAGNOSTIC: ICD-10-PCS | Performed by: COLON & RECTAL SURGERY

## 2019-04-19 PROCEDURE — 88365 INSITU HYBRIDIZATION (FISH): CPT | Performed by: COLON & RECTAL SURGERY

## 2019-04-19 PROCEDURE — 88342 IMHCHEM/IMCYTCHM 1ST ANTB: CPT | Performed by: COLON & RECTAL SURGERY

## 2019-04-19 PROCEDURE — 82962 GLUCOSE BLOOD TEST: CPT

## 2019-04-19 PROCEDURE — 88305 TISSUE EXAM BY PATHOLOGIST: CPT | Performed by: COLON & RECTAL SURGERY

## 2019-04-19 PROCEDURE — 0DBP8ZX EXCISION OF RECTUM, VIA NATURAL OR ARTIFICIAL OPENING ENDOSCOPIC, DIAGNOSTIC: ICD-10-PCS | Performed by: COLON & RECTAL SURGERY

## 2019-04-19 PROCEDURE — 3E0H8GC INTRODUCTION OF OTHER THERAPEUTIC SUBSTANCE INTO LOWER GI, VIA NATURAL OR ARTIFICIAL OPENING ENDOSCOPIC: ICD-10-PCS | Performed by: COLON & RECTAL SURGERY

## 2019-04-19 RX ORDER — SODIUM CHLORIDE, SODIUM LACTATE, POTASSIUM CHLORIDE, CALCIUM CHLORIDE 600; 310; 30; 20 MG/100ML; MG/100ML; MG/100ML; MG/100ML
INJECTION, SOLUTION INTRAVENOUS CONTINUOUS
Status: DISCONTINUED | OUTPATIENT
Start: 2019-04-19 | End: 2019-04-19

## 2019-04-19 NOTE — ANESTHESIA PREPROCEDURE EVALUATION
PRE-OP EVALUATION    Patient Name: Joshua Rios    Pre-op Diagnosis: Colon cancer screening [Z12.11]    Procedure(s):  COLONOSCOPY    Surgeon(s) and Role:     * Angus Gamble MD - Primary    Pre-op vitals reviewed.         Body mass index is 27.27 Cardiovascular        Exercise tolerance: good     MET: >4    (+) obesity  (+) hypertension     (+) CAD    (+) CABG/stent                            Endo/Other    Negative endo/other ROS.                               Pulmonary    Negativ 2   Plan: MAC  NPO status verified and patient meets guidelines. Plan/risks discussed with: patient            Plan for MAC.  A detailed discussion of the risks and benefits of the proposed anesthetic was had in the preoperative area, including ris

## 2019-04-19 NOTE — H&P
Active Problems      1. History of colon cancer, stage II    2. Monoallelic mutation of MUTYH gene    3. PUD (peptic ulcer disease)    4. Type 2 diabetes mellitus without complication, without long-term current use of insulin ([facility] [address]    5.  Essential hyperten Personal history of antineoplastic chemotherapy last dose 10/24/2017   • PUD (peptic ulcer disease)       s/p UGIB in his 25s   • Visual impairment       glasses            Past Surgical History:   Procedure Laterality Date   • CATH BARE METAL STENT (BMS) daily.   acetaminophen 500 MG Oral Tab Take 1,000 mg by mouth one time. DULoxetine HCl 60 MG Oral Cap DR Particles Take 60 mg by mouth daily. Cholecalciferol (VITAMIN D) 1000 units Oral Tab Take by mouth daily.    aspirin 81 MG Oral Tab EC Take 81 mg by Genitourinary: Negative for dysuria and hematuria. Musculoskeletal: Negative for arthralgias and myalgias. Skin: Negative for rash and wound. Neurological: Negative for dizziness, weakness and headaches.    Hematological: Does not bruise/bleed easil bowels functional.     We will plan for colonoscopy at the end of April for surveillance.   He did have several tubular adenomas at his previous colonoscopy prior to colostomy reversal.     We will request cardiac clearance prior to colonoscopy.     We will

## 2019-04-19 NOTE — OPERATIVE REPORT
[facility]  Operative Note    Marissa Montelongo Location: OR   [facility] [id number] MRN [id number]    1950 Age 77year old   Admission Date 2019 Operation Date 2019   Attending Physician Sarah Lala MD Operating Physician Johnny Hendrix external skin tag. There was moderate enlargement of the internal hemorrhoid tissue. There was pandiverticulosis without active inflammation. Description of Procedure:    The risks, benefits, and alternatives were discussed with the patient and they [facility] anesthesia care was terminated and the patient transported to the recovery unit in good condition. The patient tolerated the procedure well without apparent intraoperative complication.     Follow up:   Patient will need next colonoscopy in 1 year given the

## 2019-04-19 NOTE — ANESTHESIA POSTPROCEDURE EVALUATION
[address] Patient Status:  Hospital Outpatient Surgery   Age/Gender 77year old male MRN [id number]   Location [address] [place] [address]. Attending Shama Metcalf MD   Hosp Day # 0 PCP Kendall Bueno MD       Anesthesia Post-op

## 2019-04-23 NOTE — PROGRESS NOTES
Phoned pt, notified of test results.  appt made with Dr. Alberto Whalen  4/29/2019  3:00 PM    Ellen Don MD  [facility]

## 2019-04-29 ENCOUNTER — OFFICE VISIT (OUTPATIENT)
Dept: SURGERY | Facility: CLINIC | Age: 69
End: 2019-04-29
Payer: MEDICARE

## 2019-04-29 VITALS
WEIGHT: 224 LBS | BODY MASS INDEX: 27.28 KG/M2 | TEMPERATURE: 98 F | DIASTOLIC BLOOD PRESSURE: 80 MMHG | SYSTOLIC BLOOD PRESSURE: 160 MMHG | HEIGHT: 76 IN | HEART RATE: 68 BPM

## 2019-04-29 DIAGNOSIS — I10 ESSENTIAL HYPERTENSION: ICD-10-CM

## 2019-04-29 DIAGNOSIS — Z85.038 HISTORY OF COLON CANCER, STAGE II: ICD-10-CM

## 2019-04-29 DIAGNOSIS — D12.6 TUBULAR ADENOMA OF COLON: Primary | ICD-10-CM

## 2019-04-29 DIAGNOSIS — E11.9 TYPE 2 DIABETES MELLITUS WITHOUT COMPLICATION, WITHOUT LONG-TERM CURRENT USE OF INSULIN (HCC): ICD-10-CM

## 2019-04-29 DIAGNOSIS — I25.10 CORONARY ARTERY DISEASE INVOLVING NATIVE CORONARY ARTERY OF NATIVE HEART WITHOUT ANGINA PECTORIS: ICD-10-CM

## 2019-04-29 DIAGNOSIS — Z15.89 MONOALLELIC MUTATION OF MUTYH GENE: ICD-10-CM

## 2019-04-29 PROCEDURE — 99212 OFFICE O/P EST SF 10 MIN: CPT | Performed by: COLON & RECTAL SURGERY

## 2019-04-29 NOTE — PROGRESS NOTES
Office Visit Note       Active Problems  1. Tubular adenoma of colon    2. Monoallelic mutation of MUTYH gene    3. History of colon cancer, stage II    4. Type 2 diabetes mellitus without complication, without long-term current use of insulin ([facility]    5. vein thrombosis (HCC)     right calf- after leg surgery   • Diabetes ([facility] [address]     diet controlled   • Heart attack ([facility] [address] 02/2016    stents   • High blood pressure    • History of blood transfusion    • Monoallelic mutation of MUTYH gene 7/13/2017   • Myocardi Smoker        Packs/day: 1.00        Years: 20.00        Pack years: 20        Types: Cigarettes, Cigars        Quit date: 1983        Years since quittin.3      Smokeless tobacco: Never Used    Alcohol use:  Yes      Alcohol/week: 0.0 oz      Comm has been reviewed by me during today. Review of Systems   Constitutional: Negative for chills, diaphoresis, fatigue and fever. HENT: Negative for rhinorrhea, sore throat and trouble swallowing. Eyes: Negative for visual disturbance.    Respiratory: Ne Skin is warm and dry. No rash noted. He is not diaphoretic. Psychiatric: He has a normal mood and affect.  His behavior is normal.        Case Report   Surgical Pathology                                Case: [id number]                                    Au diverticulitis, inflammation and granulation tissue at the anastomosis without malignancy or dysplasia or stricture, mildly enlarged internal hemorrhoids without bleeding. Pathology results were discussed with the patient.     The patient should consume

## 2019-04-29 NOTE — PATIENT INSTRUCTIONS
Assessment   Tubular adenoma of colon  (primary encounter diagnosis)  Monoallelic mutation of MUTYH gene  History of colon cancer, stage II  Type 2 diabetes mellitus without complication, without long-term current use of insulin ([facility] [address]  Essential hypertensi

## 2019-05-08 ENCOUNTER — NURSE ONLY (OUTPATIENT)
Dept: HEMATOLOGY/ONCOLOGY | Facility: HOSPITAL | Age: 69
End: 2019-05-08
Attending: INTERNAL MEDICINE
Payer: MEDICARE

## 2019-05-08 VITALS
WEIGHT: 219.19 LBS | HEART RATE: 67 BPM | RESPIRATION RATE: 18 BRPM | OXYGEN SATURATION: 95 % | TEMPERATURE: 98 F | SYSTOLIC BLOOD PRESSURE: 157 MMHG | DIASTOLIC BLOOD PRESSURE: 82 MMHG | BODY MASS INDEX: 28.43 KG/M2 | HEIGHT: 73.62 IN

## 2019-05-08 DIAGNOSIS — D50.0 IRON DEFICIENCY ANEMIA DUE TO CHRONIC BLOOD LOSS: ICD-10-CM

## 2019-05-08 DIAGNOSIS — Z15.89 MONOALLELIC MUTATION OF MUTYH GENE: ICD-10-CM

## 2019-05-08 DIAGNOSIS — C18.9 COLON ADENOCARCINOMA (HCC): Primary | ICD-10-CM

## 2019-05-08 DIAGNOSIS — C18.9 COLON ADENOCARCINOMA (HCC): ICD-10-CM

## 2019-05-08 PROBLEM — R53.83 FATIGUE DUE TO TREATMENT: Status: RESOLVED | Noted: 2017-06-23 | Resolved: 2019-05-08

## 2019-05-08 PROBLEM — D64.81 ANEMIA ASSOCIATED WITH CHEMOTHERAPY: Status: RESOLVED | Noted: 2017-08-15 | Resolved: 2019-05-08

## 2019-05-08 PROBLEM — T45.1X5A ANEMIA ASSOCIATED WITH CHEMOTHERAPY: Status: RESOLVED | Noted: 2017-08-15 | Resolved: 2019-05-08

## 2019-05-08 PROCEDURE — 80053 COMPREHEN METABOLIC PANEL: CPT

## 2019-05-08 PROCEDURE — 36591 DRAW BLOOD OFF VENOUS DEVICE: CPT

## 2019-05-08 PROCEDURE — 85025 COMPLETE CBC W/AUTO DIFF WBC: CPT

## 2019-05-08 PROCEDURE — 82728 ASSAY OF FERRITIN: CPT

## 2019-05-08 PROCEDURE — 82378 CARCINOEMBRYONIC ANTIGEN: CPT

## 2019-05-08 PROCEDURE — 99214 OFFICE O/P EST MOD 30 MIN: CPT | Performed by: INTERNAL MEDICINE

## 2019-05-08 RX ORDER — LABETALOL 100 MG/1
TABLET, FILM COATED ORAL
Refills: 0 | COMMUNITY
Start: 2019-04-09

## 2019-05-08 RX ORDER — AMLODIPINE BESYLATE 10 MG/1
TABLET ORAL
Refills: 0 | COMMUNITY
Start: 2019-04-09

## 2019-05-08 NOTE — PROGRESS NOTES
Hematology/Oncology Clinic Follow Up Visit    Patient Name: Jil Dunne  Medical Record Number: [id number]    YOB: 1950    PCP: Dr. Jennie Loaiza [phone]  Other providers: Dr. 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 He denies any new aches or pains. No other neuropathy. No recent fevers or infections.       Past Medical History:  Past Medical History:   Diagnosis Date   • Back problem     cervical LAPAROSCOPIC LOW ANTERIOR COLON RESECTION N/A 4/27/2018    Performed by Dora Eddy MD at [place] [address]. Medications:    amLODIPine Besylate 10 MG Oral Tab  Disp:  Rfl: 0   Labetalol HCl 100 MG Oral Tab  Disp:  Rfl: 0   Celecoxib (Kirsten Cedenoil 20.00        Pack years: 20        Types: Cigarettes, Cigars        Quit date: 1983        Years since quittin.3      Smokeless tobacco: Never Used    Alcohol use:  Yes      Alcohol/week: 0.0 oz      Comment: 2 DRINKS MONTH    Drug use: No    Famil  (H) 05/08/2019    BUN 17 05/08/2019    BUNCREA 14.2 05/08/2019    CREATSERUM 1.20 05/08/2019    CREATSERUM 0.59 (L) 05/01/2018    CREATSERUM 0.75 04/30/2018    ANIONGAP 7 05/08/2019    GFR 88 01/20/2018    GFRNAA 61 05/08/2019    GFRAA 70 05/08/201 liver cancer as well as endometrial cancer in women.   Mr. Carlyn zamora has been made aware and has seen genetic counselor as well.  -increased frequency of surveillance colonoscopies to be done per Dr. Cat Agee as above    RTC in 3 months    Vida Mercado,

## 2019-05-08 NOTE — PROGRESS NOTES
Education Record    Learner:  Patient     Disease / Diagnosis:    Barriers / Limitations:  None   Comments:    Method:  Brief focused   Comments:    General Topics:  Plan of care reviewed   Comments:    Outcome:  Shows understanding   Comments:    Patient

## 2019-05-13 ENCOUNTER — TELEPHONE (OUTPATIENT)
Dept: SURGERY | Facility: CLINIC | Age: 69
End: 2019-05-13

## 2019-05-13 NOTE — TELEPHONE ENCOUNTER
Pt phoned office, states that he talked to [address] [place] [address] . Dr. Maggy Vogel would like pt to wait for port removal. Asked to inform Dr. Emily Ash of above.

## 2019-08-13 ENCOUNTER — APPOINTMENT (OUTPATIENT)
Dept: HEMATOLOGY/ONCOLOGY | Age: 69
End: 2019-08-13
Attending: INTERNAL MEDICINE
Payer: MEDICARE

## 2019-08-21 ENCOUNTER — TELEPHONE (OUTPATIENT)
Dept: HEMATOLOGY/ONCOLOGY | Facility: HOSPITAL | Age: 69
End: 2019-08-21

## 2019-08-21 NOTE — TELEPHONE ENCOUNTER
Left msg for pt's dtr, Sheldon [facility]) to call me back to discuss poss Survivorship appt or to decline this service completely.

## 2019-09-18 ENCOUNTER — TELEPHONE (OUTPATIENT)
Dept: HEMATOLOGY/ONCOLOGY | Facility: HOSPITAL | Age: 69
End: 2019-09-18

## 2019-12-08 NOTE — PATIENT INSTRUCTIONS
Assessment   Follow-up examination  (primary encounter diagnosis)      Plan   Patient is doing well 11 days status post robotic assisted colostomy reversal.    The patient's drain, staples, and Steri-Strips were removed at the bedside.     No evidence of wo
Home

## 2021-01-29 DIAGNOSIS — Z23 NEED FOR VACCINATION: ICD-10-CM

## 2021-04-05 ENCOUNTER — IMMUNIZATION (OUTPATIENT)
Dept: LAB | Facility: HOSPITAL | Age: 71
End: 2021-04-05
Attending: HOSPITALIST
Payer: MEDICARE

## 2021-04-05 DIAGNOSIS — Z23 NEED FOR VACCINATION: Primary | ICD-10-CM

## 2021-04-05 PROCEDURE — 0011A SARSCOV2 VAC 100MCG/0.5ML IM: CPT

## 2021-05-03 ENCOUNTER — IMMUNIZATION (OUTPATIENT)
Dept: LAB | Facility: HOSPITAL | Age: 71
End: 2021-05-03
Attending: EMERGENCY MEDICINE
Payer: MEDICARE

## 2021-05-03 DIAGNOSIS — Z23 NEED FOR VACCINATION: Primary | ICD-10-CM

## 2021-05-03 PROCEDURE — 0012A SARSCOV2 VAC 100MCG/0.5ML IM: CPT

## 2022-07-30 ENCOUNTER — TELEPHONE ENCOUNTER (OUTPATIENT)
Age: 72
End: 2022-07-30

## 2022-07-31 ENCOUNTER — TELEPHONE ENCOUNTER (OUTPATIENT)
Age: 72
End: 2022-07-31

## (undated) DEVICE — BETADINE SOLUTION 8 OZ BTL

## (undated) DEVICE — 3M™ RED DOT™ MONITORING ELECTRODE WITH FOAM TAPE AND STICKY GEL, 50/BAG, 20/CASE, 72/PLT 2570: Brand: RED DOT™

## (undated) DEVICE — NEEDLE CONTRAST INTERJECT 25G

## (undated) DEVICE — NITINOL WIRE STR 035

## (undated) DEVICE — SOL  .9 1000ML BAG

## (undated) DEVICE — SUTURE PROLENE 2-0 SH

## (undated) DEVICE — BULB SYRINGE,IRRIGATION WITH PROTECTIVE CAP: Brand: DOVER

## (undated) DEVICE — LAP COLON CDS: Brand: MEDLINE INDUSTRIES, INC.

## (undated) DEVICE — SOL H2O 3000ML IRRIG

## (undated) DEVICE — ARM DRAPE

## (undated) DEVICE — Device

## (undated) DEVICE — KENDALL SCD EXPRESS SLEEVES, KNEE LENGTH, MEDIUM: Brand: KENDALL SCD

## (undated) DEVICE — COVER,MAYO STAND,STERILE: Brand: MEDLINE

## (undated) DEVICE — CLIP RESOLUTION 235CM

## (undated) DEVICE — VIOLET BRAIDED (POLYGLACTIN 910), SYNTHETIC ABSORBABLE SUTURE: Brand: COATED VICRYL

## (undated) DEVICE — CANNULA SEAL

## (undated) DEVICE — SUTURE VICRYL 0

## (undated) DEVICE — MEDI-VAC TUBING CONNECTOR 6-IN-1 "Y" POLYPROPYLENE: Brand: CARDINAL HEALTH

## (undated) DEVICE — AIRSEAL 8 MM ACCESS PORT AND LOW PROFILE OBTURATOR WITH BLADELESS OPTICAL TIP, 120 MM LENGTH: Brand: AIRSEAL

## (undated) DEVICE — SUTURE MONOCRYL 4-0 PS-2

## (undated) DEVICE — BLADELESS OBTURATOR: Brand: WECK VISTA

## (undated) DEVICE — TROCAR: Brand: KII SHIELDED BLADED ACCESS SYSTEM

## (undated) DEVICE — DRAPE EQUIPMENT INTRATEMP THER

## (undated) DEVICE — 1200CC GUARDIAN II: Brand: GUARDIAN

## (undated) DEVICE — STERILE POLYISOPRENE POWDER-FREE SURGICAL GLOVES: Brand: PROTEXIS

## (undated) DEVICE — BAG DRAIN INFECTION CNTRL 2000

## (undated) DEVICE — COLUMN DRAPE

## (undated) DEVICE — CLOSING BUNDLE: Brand: MEDLINE INDUSTRIES, INC.

## (undated) DEVICE — SUTURE ETHILON 2-0 FS

## (undated) DEVICE — DRAIN CHANNEL 19FR BLAKE

## (undated) DEVICE — ELECTRO LUBE IS A SINGLE PATIENT USE DEVICE THAT IS INTENDED TO BE USED ON ELECTROSURGICAL ELECTRODES TO REDUCE STICKING.: Brand: KEY SURGICAL ELECTRO LUBE

## (undated) DEVICE — DRAIN RELIAVAC 100CC

## (undated) DEVICE — SUTURE VICRYL 3-0 SH

## (undated) DEVICE — WOUND RETRACTOR AND PROTECTOR: Brand: ALEXIS O WOUND PROTECTOR-RETRACTOR

## (undated) DEVICE — STAPLER 45 RELOAD BLUE: Brand: ENDOWRIST

## (undated) DEVICE — REDUCER: Brand: ENDOWRIST

## (undated) DEVICE — GAUZE SPONGES,USP TYPE VII GAUZE, 12 PLY: Brand: CURITY

## (undated) DEVICE — 40580 - THE PINK PAD - ADVANCED TRENDELENBURG POSITIONING KIT: Brand: 40580 - THE PINK PAD - ADVANCED TRENDELENBURG POSITIONING KIT

## (undated) DEVICE — SUTURE PDS II 1 CT-1

## (undated) DEVICE — SIGMOIDOSCOPE LIGHTED BIOSEAL

## (undated) DEVICE — ENDOSCOPY PACK - LOWER: Brand: MEDLINE INDUSTRIES, INC.

## (undated) DEVICE — PROXIMATE RH ROTATING HEAD SKIN STAPLERS (35 WIDE) CONTAINS 35 STAINLESS STEEL STAPLES: Brand: PROXIMATE

## (undated) DEVICE — SNARE CAPTIFLEX MICRO-OVL OLY

## (undated) DEVICE — SEAL Y BIOPSY PORT P6R SCOPE

## (undated) DEVICE — STAPLER CANNULA SEAL: Brand: ENDOWRIST

## (undated) DEVICE — OPEN-END FLEXI-TIP URETERAL CATHETER: Brand: FLEXI-TIP

## (undated) DEVICE — TRI-LUMEN FILTERED TUBE SET WITH ACTIVATED CHARCOAL FILTER: Brand: AIRSEAL

## (undated) DEVICE — CYSTO CDS-LF: Brand: MEDLINE INDUSTRIES, INC.

## (undated) DEVICE — SOL  .9 3000ML

## (undated) DEVICE — 3M™ TEGADERM™ TRANSPARENT FILM DRESSING, 1626W, 4 IN X 4-3/4 IN (10 CM X 12 CM), 50 EACH/CARTON, 4 CARTON/CASE: Brand: 3M™ TEGADERM™

## (undated) DEVICE — TIP COVER ACCESSORY

## (undated) DEVICE — TRAP 4 CPTR CHMBR N EZ INLN

## (undated) DEVICE — VESSEL SEALER: Brand: ENDOWRIST

## (undated) DEVICE — TUBING CYSTO

## (undated) DEVICE — Device: Brand: DEFENDO AIR/WATER/SUCTION AND BIOPSY VALVE

## (undated) DEVICE — MEDI-VAC YANKAUER SUCTION HANDLE W/BULBOUS TIP: Brand: CARDINAL HEALTH

## (undated) DEVICE — GOWN,SIRUS,FABRIC-REINFORCED,X-LARGE: Brand: MEDLINE

## (undated) DEVICE — VISUALIZATION SYSTEM: Brand: CLEARIFY

## (undated) DEVICE — TAPE UMBILICAL U11T

## (undated) DEVICE — SEAL

## (undated) DEVICE — SOL  .9 1000ML BTL

## (undated) DEVICE — STAPLER SHEATH: Brand: ENDOWRIST

## (undated) DEVICE — CADIERE FORCEPS: Brand: ENDOWRIST

## (undated) DEVICE — FORCEP RADIAL JAW 4

## (undated) DEVICE — FILTERLINE NASAL ADULT O2/CO2

## (undated) NOTE — MR AVS SNAPSHOT
After Visit Summary   6/22/2017    Zoë Palumbo    MRN: [id number]           Diagnoses this Visit     Colon adenocarcinoma [facility])    -  Primary       Allergies     Allopurinol Hives    Zyloprim Anaphylaxis      Your Vital Signs Were     Smoking Statu

## (undated) NOTE — LETTER
05/09/18    Dear Adrienne Prince MD,    I am seeing Joshua Rios in the office today after robotic assisted laparoscopic colostomy reversal on 4/27/2018. .            Assessment   Follow-up examination  (primary encounter diagnosis)      Plan   Patient is do

## (undated) NOTE — MR AVS SNAPSHOT
After Visit Summary   5/31/2017    Estefani Dunn    MRN: [id number]           Diagnoses this Visit     Adenocarcinoma of colon [facility])    -  Primary     Family history of colonic polyps           Allergies     Allopurinol Hives    Zyloprim Anaphylaxis not sign up before the expiration date, you must request a new code. Your unique Interview Rocket Access Code: [email or web address]  Expires: 6/27/2017  2:58 PM    If you have questions, you can call [phone] to talk to our [facility] Staff.  Remember, Interview Rocket

## (undated) NOTE — MR AVS SNAPSHOT
After Visit Summary   6/20/2017    Mele Banuelos    MRN: [id number]           Diagnoses this Visit     Colon adenocarcinoma [facility])    -  Primary       Allergies     Allopurinol Hives    Zyloprim Anaphylaxis      Your Vital Signs Were     BP Pulse Temp Component Results     Component Value Flag Ref Range Units Status    Glucose 96  70-99  mg/dL Final    BUN 17  8-20  mg/dL Final    Creatinine 0.83  0.70-1.30  mg/dL Final    GFR 90  >=60   Final    Comment:       Estimated GFR units: mL/min/1.73 square Lymphocyte % 31.0   % Final    Monocyte % 14.8   % Final    Eosinophil % 2.0   % Final    Basophil % 0.5   % Final    Immature Granulocyte % 0.3   % Final               MyChart     Visit MyChart  You can access your MyChart to more actively manage your he

## (undated) NOTE — LETTER
[facility]  Amairani Carter [id number] [facility], [address] [place] [address]    Consent for Operation    Date: __________________    Time: _______________    1.  I authorize the performance upon Zoë Palumbo the following operation:    Procedure(s):  CYSTOSCOPY, BILATERAL U revealed by the pictures or by descriptive texts accompanying them. If the procedure has been videotaped, the surgeon will obtain the original videotape. The hospital will not be responsible for storage or maintenance of this tape.     6. For the purpose of THAT MY DOCTOR PROVIDED ME WITH THE ABOVE EXPLANATIONS, THAT ALL BLANKS OR STATEMENTS REQUIRING INSERTION OR COMPLETION WERE FILLED IN.     Signature of Patient:   ___________________________    When the patient is a minor or mentally incompetent to give co supplements, and pills I can buy without a prescription (including street drugs/illegal medications). Failure to inform my anesthesiologist about these medicines may increase my risk of anesthetic complications.   · If I am allergic to anything or have had Anesthesiologist Signature     Date   Time  I have discussed the procedure and information above with the patient (or patient’s representative) and answered their questions. The patient or their representative has agreed to have anesthesia services.     ___

## (undated) NOTE — LETTER
04/29/19    Dear Hilda Edmond MD,    I am seeing Ale Jorden in the office today after colonoscopy.             Assessment   Tubular adenoma of colon  (primary encounter diagnosis)  Monoallelic mutation of MUTYH gene  History of colon cancer, stage II  T

## (undated) NOTE — LETTER
Anita Salgado [address] [address]  [place], [address] [facility]    Consent for Operation  Date: __________________                                Time: _______________    1.  I authorize the performance upon Alvin Louis the following operation:  Procedure(s) procedure has been videotaped, the surgeon will obtain the original videotape. The hospital will not be responsible for storage or maintenance of this tape.     6. For the purpose of advancing medical education, I consent to the admittance of observers to t STATEMENTS REQUIRING INSERTION OR COMPLETION WERE FILLED IN.     Signature of Patient:   ___________________________    When the patient is a minor or mentally incompetent to give consent:  Signature of person authorized to consent for patient: ____________

## (undated) NOTE — MR AVS SNAPSHOT
After Visit Summary   6/6/2017    Tye Laguerre    MRN: [id number]           Diagnoses this Visit     Colon adenocarcinoma [facility])    -  Primary     Diarrhea, unspecified type         Non-intractable vomiting with nausea, unspecified vomiting type 5/19/20 Pt unsure of name of B/P meds- BB                      Patient Instructions     None      Please Note     If a lab draw is part of your appointment, please arrive 15 minutes early.       To Do List     Tuesday June 06, 2017     LAB:  CBC WITH DIFF You can access your MyChart to more actively manage your health care and view more details from this visit by going to [email or web address].   If you've recently had a stay at the Hospital you can access your discharge instructions in [facility] [place] by nash

## (undated) NOTE — LETTER
17    Patient: Kim Grubbs  : 1950 Visit date: 2017    Dear  Dr. Loretta Torres MD,    Thank you for referring Kim Grubbs to my practice. Please find my assessment and plan below.                 Assessment   Colostomy status ([facility] [address]  (primary back to the [facility]. Of note the patient's blood pressure today in the office was over 361 systolic. The patient will also need to stop his aspirin prior to colonoscopy and then again prior to any surgery.     We will shoot for mid-to-late January

## (undated) NOTE — MR AVS SNAPSHOT
After Visit Summary   5/23/2017    Debra Reveles    MRN: [id number]           Diagnoses this Visit     Colon adenocarcinoma [facility])    -  Primary     Iron deficiency anemia due to chronic blood loss         Chemotherapy induced nausea and vomiting Return in about 1 day (around 5/24/2017) for md appt at 8:15am with labs and chemo and APN chemo teach.       To Do List     Tuesday May 23, 2017     LAB:  CBC WITH DIFFERENTIAL WITH PLATELET        Tuesday May 23, 2017     LAB:  COMP METABOLIC PANEL (14)

## (undated) NOTE — MR AVS SNAPSHOT
After Visit Summary   5/31/2017    William Virgen    MRN: [id number]           Diagnoses this Visit     Colon adenocarcinoma [facility])    -  Primary     Encounter for education           Allergies     Allopurinol Hives    Zyloprim Anaphylaxis      Your Vi Appointment with TIRSO PEDERSON RN1 at [facility] in [place] [phone])   [address]       Tuesday June 06, 2017 10:30 AM     Appointment with PF Darylene Shams at [facility] in [place] ([phone] [address]

## (undated) NOTE — MR AVS SNAPSHOT
After Visit Summary   6/6/2017    Tom Cardenas    MRN: [id number]           Diagnoses this Visit     Colon adenocarcinoma [facility])    -  Primary     Nausea         Diarrhea           Allergies     Allopurinol Hives    Zyloprim Anaphylaxis      Your Vit Appointment with Mauro Coleman at [facility] in [place] [phone])   [address]       Tuesday July 11, 2017 11:15 AM     Appointment with TIRSO March at [facility] in [place] ([phone]) Chloride 105  101-111  mmol/L Final    CO2 27.0  22.0-32.0  mmol/L Final         Result Summary for CBC W/ DIFFERENTIAL      Component Results     Component Value Flag Ref Range Units Status    WBC 4.6  4.0-13.0  x10(3) uL Final    RBC 4.24  3.80-5.80  x1

## (undated) NOTE — Clinical Note
Printed: 2017    Patient Name: Justo Rodriguez  : 1950   Medical Record #: [id number]    Consent to Chemotherapy    I, Justo Rodriguez, understand that I have been diagnosed with colon cancer.     I understand that the treatment suggested by my docto Patient Signature                                                            Date      For patients requiring translation or verbal reading of this document, the person reading/translating should document and sign below:    [place]/ Signature

## (undated) NOTE — LETTER
08/16/18    Dear Sue Galeano MD,    I am seeing Anaiscarolhussain Soria in the office today 4 months after colostomy reversal.            Assessment   Follow-up examination  (primary encounter diagnosis)  History of colon cancer, stage II  Monoallelic mutation of

## (undated) NOTE — MR AVS SNAPSHOT
After Visit Summary   5/31/2017    Sachin Barnard    MRN: [id number]           Diagnoses this Visit     Colon adenocarcinoma [facility])    -  Primary       Allergies     Allopurinol Hives    Zyloprim Anaphylaxis      Your Vital Signs Were     Smoking Statu If a lab draw is part of your appointment, please arrive 15 minutes early. Follow-up Instructions     Return in about 2 weeks (around 6/13/2017) for md appt with labs and chemo.       To Do List     Friday June 02, 2017 10:00 AM     Appointment with MARY

## (undated) NOTE — LETTER
Krish Edwards M.D.     Surgical Clearance Needed    Date: 12/21/2017                                                                       From: DR. Gunnar Plascencia    Attn: DR. Milla Mason                                                                 Fax: [phone]

## (undated) NOTE — MR AVS SNAPSHOT
After Visit Summary   6/2/2017    Darion Vargas    MRN: [id number]           Diagnoses this Visit     Colon adenocarcinoma [facility])    -  Primary       Allergies     Allopurinol Hives    Zyloprim Anaphylaxis      Your Vital Signs Were     Smoking Status Appointment with Danilo Conte at [facility] in [place] [phone])   [address]       Tuesday July 11, 2017 11:15 AM     Appointment with TIRSO March at [facility] in [place] ([phone])

## (undated) NOTE — LETTER
18    Patient: Alvin Louis  : 1950 Visit date: 2018    Dear  Dr. Mitzi Khan MD,    Alvin Louis presented to my office today. Please find my assessment and plan below.                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 We will follow-up on lab work that is to be drawn by Dr. Merry Hightower later in the month.                Sincerely,       Fior Gross MD   CC: Christine Renteria MD

## (undated) NOTE — MR AVS SNAPSHOT
After Visit Summary   6/20/2017    Jason Calvert    MRN: [id number]           Allergies     Allopurinol Hives    Zyloprim Anaphylaxis      Your Vital Signs Were     Smoking Status                   Former Smoker           Your Current Medications If a lab draw is part of your appointment, please arrive 15 minutes early.       To Do List     Tuesday June 20, 2017 11:30 AM     Appointment with PF Darylene Shams at [facility] in [place] [phone] [place]  [address] [facility]

## (undated) NOTE — LETTER
Anita Salgado [address]  Jordyn, [address] [place] [address]    Consent for Operation  Date: __________________                                Time: _______________    1.  I authorize the performance upon Mele Banuelos the following operation:  Procedure(s) procedure has been videotaped, the surgeon will obtain the original videotape. The hospital will not be responsible for storage or maintenance of this tape.   7. For the purpose of advancing medical education, I consent to the admittance of observers to the STATEMENTS REQUIRING INSERTION OR COMPLETION WERE FILLED IN.     Signature of Patient:   ___________________________    When the patient is a minor or mentally incompetent to give consent:  Signature of person authorized to consent for patient: ____________ drugs/illegal medications). Failure to inform my anesthesiologist about these medicines may increase my risk of anesthetic complications. iv. If I am allergic to anything or have had a reaction to anesthesia before.   3. I understand how the anesthesia med I have discussed the procedure and information above with the patient (or patient’s representative) and answered their questions. The patient or their representative has agreed to have anesthesia services.     _______________________________________________

## (undated) NOTE — MR AVS SNAPSHOT
After Visit Summary   4/27/2017    Paola Mathias    MRN: [id number]           Allergies     Zyloprim Anaphylaxis      Medication Comments      5/19/20 Pt unsure of name of B/P meds- BB                      Patient Instructions     None      Please No

## (undated) NOTE — MR AVS SNAPSHOT
After Visit Summary   5/31/2017    Debra Reveles    MRN: [id number]           Diagnoses this Visit     Colon adenocarcinoma [facility])    -  Primary     Iron deficiency anemia due to chronic blood loss           Allergies     Allopurinol Hives    Zyloprim Result Summary for CBC WITH DIFFERENTIAL WITH PLATELET      Narrative     The following orders were created for panel order CBC WITH DIFFERENTIAL WITH PLATELET.   Procedure                               Abnormality         Status                     ------- MCHC 30.0 (L) 31.0-37.0  g/dL Final    RDW 18.6 (H) 11.5-16.0  % Final    RDW-SD 57.6 (H) 35.1-46.3  fL Final    Neutrophil Absolute Prelim 1.93  1.30-6.70  x10 (3) uL Final         Result Summary for SCAN SLIDE      Component Results     Component    Sli medical emergencies, dial 911.